# Patient Record
Sex: MALE | Race: WHITE | NOT HISPANIC OR LATINO | Employment: FULL TIME | URBAN - METROPOLITAN AREA
[De-identification: names, ages, dates, MRNs, and addresses within clinical notes are randomized per-mention and may not be internally consistent; named-entity substitution may affect disease eponyms.]

---

## 2023-05-08 ENCOUNTER — OFFICE VISIT (OUTPATIENT)
Dept: PRIMARY CARE | Facility: CLINIC | Age: 68
End: 2023-05-08
Payer: COMMERCIAL

## 2023-05-08 VITALS
BODY MASS INDEX: 39.98 KG/M2 | HEIGHT: 66 IN | DIASTOLIC BLOOD PRESSURE: 72 MMHG | HEART RATE: 60 BPM | SYSTOLIC BLOOD PRESSURE: 136 MMHG | TEMPERATURE: 98.1 F | OXYGEN SATURATION: 97 % | WEIGHT: 248.8 LBS

## 2023-05-08 DIAGNOSIS — E78.5 HYPERLIPIDEMIA, UNSPECIFIED HYPERLIPIDEMIA TYPE: ICD-10-CM

## 2023-05-08 DIAGNOSIS — N20.0 KIDNEY STONE: ICD-10-CM

## 2023-05-08 DIAGNOSIS — E66.01 OBESITY, MORBID (MULTI): ICD-10-CM

## 2023-05-08 DIAGNOSIS — I10 BENIGN ESSENTIAL HYPERTENSION: Primary | ICD-10-CM

## 2023-05-08 DIAGNOSIS — Z00.00 ANNUAL PHYSICAL EXAM: ICD-10-CM

## 2023-05-08 DIAGNOSIS — Z12.5 PROSTATE CANCER SCREENING: ICD-10-CM

## 2023-05-08 DIAGNOSIS — E11.9 CONTROLLED TYPE 2 DIABETES MELLITUS WITHOUT COMPLICATION, WITHOUT LONG-TERM CURRENT USE OF INSULIN (MULTI): ICD-10-CM

## 2023-05-08 PROBLEM — D49.2 SKIN NEOPLASM: Status: ACTIVE | Noted: 2023-05-08

## 2023-05-08 PROBLEM — M43.10 SPONDYLOLISTHESIS, ACQUIRED: Status: ACTIVE | Noted: 2023-05-08

## 2023-05-08 PROBLEM — M54.50 ACUTE LUMBAR BACK PAIN: Status: ACTIVE | Noted: 2023-05-08

## 2023-05-08 PROBLEM — E66.9 OBESITY: Status: ACTIVE | Noted: 2023-05-08

## 2023-05-08 PROBLEM — B35.3 TINEA PEDIS: Status: ACTIVE | Noted: 2023-05-08

## 2023-05-08 PROBLEM — R21 RASH: Status: ACTIVE | Noted: 2023-05-08

## 2023-05-08 PROBLEM — H61.22 IMPACTED CERUMEN OF LEFT EAR: Status: ACTIVE | Noted: 2023-05-08

## 2023-05-08 PROBLEM — R06.83 SNORING: Status: ACTIVE | Noted: 2023-05-08

## 2023-05-08 PROCEDURE — 4010F ACE/ARB THERAPY RXD/TAKEN: CPT | Performed by: INTERNAL MEDICINE

## 2023-05-08 PROCEDURE — 1036F TOBACCO NON-USER: CPT | Performed by: INTERNAL MEDICINE

## 2023-05-08 PROCEDURE — 3078F DIAST BP <80 MM HG: CPT | Performed by: INTERNAL MEDICINE

## 2023-05-08 PROCEDURE — 99214 OFFICE O/P EST MOD 30 MIN: CPT | Performed by: INTERNAL MEDICINE

## 2023-05-08 PROCEDURE — 3075F SYST BP GE 130 - 139MM HG: CPT | Performed by: INTERNAL MEDICINE

## 2023-05-08 PROCEDURE — 1159F MED LIST DOCD IN RCRD: CPT | Performed by: INTERNAL MEDICINE

## 2023-05-08 RX ORDER — METFORMIN HYDROCHLORIDE 1000 MG/1
1000 TABLET ORAL 2 TIMES DAILY
COMMUNITY
Start: 2012-01-27 | End: 2023-05-23 | Stop reason: SDUPTHER

## 2023-05-08 RX ORDER — SODIUM FLUORIDE 6 MG/ML
PASTE, DENTIFRICE DENTAL DAILY
COMMUNITY
Start: 2022-11-16

## 2023-05-08 RX ORDER — SPIRONOLACTONE 25 MG/1
25 TABLET ORAL DAILY
COMMUNITY
Start: 2011-08-31 | End: 2023-05-23 | Stop reason: SDUPTHER

## 2023-05-08 RX ORDER — QUINAPRIL 40 MG/1
40 TABLET ORAL DAILY
COMMUNITY
Start: 2011-08-31 | End: 2023-11-28 | Stop reason: ALTCHOICE

## 2023-05-08 RX ORDER — GLYBURIDE 5 MG/1
5 TABLET ORAL 2 TIMES DAILY
COMMUNITY
Start: 2012-10-05 | End: 2023-05-23 | Stop reason: SDUPTHER

## 2023-05-08 RX ORDER — METOPROLOL SUCCINATE 100 MG/1
100 TABLET, EXTENDED RELEASE ORAL DAILY
COMMUNITY
Start: 2015-02-15 | End: 2023-05-23 | Stop reason: SDUPTHER

## 2023-05-08 RX ORDER — ASPIRIN 81 MG/1
81 TABLET ORAL DAILY
COMMUNITY
Start: 2021-06-16

## 2023-05-08 RX ORDER — AMLODIPINE BESYLATE 5 MG/1
5 TABLET ORAL DAILY
COMMUNITY
Start: 2021-12-01 | End: 2023-05-23 | Stop reason: SDUPTHER

## 2023-05-08 RX ORDER — LISINOPRIL 40 MG/1
40 TABLET ORAL DAILY
COMMUNITY
End: 2023-08-23 | Stop reason: ALTCHOICE

## 2023-05-08 RX ORDER — ATORVASTATIN CALCIUM 10 MG/1
10 TABLET, FILM COATED ORAL DAILY
COMMUNITY
Start: 2011-08-31 | End: 2023-05-23 | Stop reason: SDUPTHER

## 2023-05-08 RX ORDER — PIOGLITAZONEHYDROCHLORIDE 45 MG/1
45 TABLET ORAL DAILY
COMMUNITY
Start: 2017-05-01 | End: 2023-05-23 | Stop reason: SDUPTHER

## 2023-05-08 RX ORDER — SITAGLIPTIN 50 MG/1
50 TABLET, FILM COATED ORAL DAILY
COMMUNITY
Start: 2019-06-07 | End: 2023-05-23 | Stop reason: SDUPTHER

## 2023-05-08 ASSESSMENT — ENCOUNTER SYMPTOMS
FATIGUE: 0
ENDOCRINE NEGATIVE: 1
UNEXPECTED WEIGHT CHANGE: 0
PSYCHIATRIC NEGATIVE: 1
HEADACHES: 0
GASTROINTESTINAL NEGATIVE: 1
DIFFICULTY URINATING: 0
DIZZINESS: 0
ABDOMINAL PAIN: 0
COUGH: 0
CHEST TIGHTNESS: 0
BLOOD IN STOOL: 0
RESPIRATORY NEGATIVE: 1
EYES NEGATIVE: 1
DIARRHEA: 0
DYSURIA: 0
NAUSEA: 0
SHORTNESS OF BREATH: 0
HEMATOLOGIC/LYMPHATIC NEGATIVE: 1
NEUROLOGICAL NEGATIVE: 1

## 2023-05-08 ASSESSMENT — PATIENT HEALTH QUESTIONNAIRE - PHQ9
SUM OF ALL RESPONSES TO PHQ9 QUESTIONS 1 AND 2: 0
1. LITTLE INTEREST OR PLEASURE IN DOING THINGS: NOT AT ALL
2. FEELING DOWN, DEPRESSED OR HOPELESS: NOT AT ALL

## 2023-05-08 NOTE — PROGRESS NOTES
"Subjective   Patient ID: Kevin Borja is a 67 y.o. male who presents for Follow up on medication .     Last seen a year ago,here for follow up on HTN,HLD,DM-2,taken his meds regularly,no side effect ,he denies any hypoglycemic reaction.  he denies any side effects .  he continue to work night shift,on feet all the time at work,he sleeps 4 hours straight,denies any fatigue.  in past he brought his home BP machine and found to be accurate.  He is planing on retiring soon.  He is due for labs.  He is back on Accupril(from being on Lisinopril temporarily because the other was out of stock)  he had his eye exam June 2021 and had no retinopathy.  He passed a kidney stone 3 months ago,but lost the stone,was shown to me on his phone,no recurrence of his back pain,he drinks enough water daily.                          Review of Systems   Constitutional:  Negative for fatigue and unexpected weight change.   HENT: Negative.  Negative for congestion.    Eyes: Negative.    Respiratory: Negative.  Negative for cough, chest tightness and shortness of breath.    Cardiovascular:  Negative for chest pain and leg swelling.   Gastrointestinal: Negative.  Negative for abdominal pain, blood in stool, diarrhea and nausea.   Endocrine: Negative.    Genitourinary: Negative.  Negative for difficulty urinating and dysuria.   Neurological: Negative.  Negative for dizziness and headaches.   Hematological: Negative.    Psychiatric/Behavioral: Negative.         Objective   /72 (BP Location: Right arm, Patient Position: Sitting, BP Cuff Size: Large adult)   Pulse 60   Temp 36.7 °C (98.1 °F) (Temporal)   Ht 1.67 m (5' 5.75\")   Wt 113 kg (248 lb 12.8 oz)   SpO2 97%   BMI 40.46 kg/m²     Physical Exam  Vitals reviewed.   Constitutional:       Appearance: Normal appearance.   HENT:      Head: Normocephalic and atraumatic.   Eyes:      Extraocular Movements: Extraocular movements intact.      Pupils: Pupils are equal, round, and reactive " to light.   Cardiovascular:      Rate and Rhythm: Normal rate and regular rhythm.      Heart sounds: Normal heart sounds.   Pulmonary:      Effort: Pulmonary effort is normal. No respiratory distress.      Breath sounds: Normal breath sounds. No wheezing or rhonchi.   Abdominal:      General: Abdomen is flat. Bowel sounds are normal.      Palpations: Abdomen is soft.      Tenderness: There is no abdominal tenderness.      Comments: Obese abdomen.   Musculoskeletal:         General: Normal range of motion.      Cervical back: Normal range of motion and neck supple.   Skin:     General: Skin is warm and dry.   Neurological:      General: No focal deficit present.      Mental Status: He is alert and oriented to person, place, and time.   Psychiatric:         Mood and Affect: Mood normal.         Behavior: Behavior normal.         Assessment/Plan   Problem List Items Addressed This Visit          Circulatory    Benign essential hypertension - Primary     Stable on current meds.            Endocrine/Metabolic    Diabetes mellitus type II, controlled (CMS/HCC)     Continue same meds.  Follow strict 1800 cy. ADA diet.  Order labs.  See ophthalmologist for yearly eye exam.         Obesity, morbid (CMS/HCC)     I spent >15 minutes face to face with individual providing recommendations for nutrition choices and exercise plan to help achieve weight reduction.            Other    Hyperlipidemia     Stable on current statin,follow low fat diet.          Other Visit Diagnoses       Kidney stone        keep well hydrated.    Prostate cancer screening        Relevant Orders    Prostate Specific Antigen    Annual physical exam        Relevant Orders    CBC and Auto Differential    Comprehensive Metabolic Panel    Hemoglobin A1C    Lipid Panel    Prostate Specific Antigen    TSH with reflex to Free T4 if abnormal    Albumin , Urine Random

## 2023-05-08 NOTE — PATIENT INSTRUCTIONS
I spent >15 minutes face to face with individual providing recommendations for nutrition choices and exercise plan to help achieve weight reduction.  Continue same meds.  Return in 3 months for physical.  See your ophthalmologist for yearly eye exam.

## 2023-05-08 NOTE — ASSESSMENT & PLAN NOTE
I spent >15 minutes face to face with individual providing recommendations for nutrition choices and exercise plan to help achieve weight reduction.

## 2023-05-23 DIAGNOSIS — E78.5 HYPERLIPIDEMIA, UNSPECIFIED HYPERLIPIDEMIA TYPE: ICD-10-CM

## 2023-05-23 DIAGNOSIS — I10 BENIGN ESSENTIAL HYPERTENSION: ICD-10-CM

## 2023-05-23 DIAGNOSIS — E11.9 CONTROLLED TYPE 2 DIABETES MELLITUS WITHOUT COMPLICATION, WITHOUT LONG-TERM CURRENT USE OF INSULIN (MULTI): ICD-10-CM

## 2023-05-23 RX ORDER — AMLODIPINE BESYLATE 5 MG/1
5 TABLET ORAL DAILY
Qty: 90 TABLET | Refills: 3 | Status: SHIPPED | OUTPATIENT
Start: 2023-05-23 | End: 2023-10-25 | Stop reason: SDUPTHER

## 2023-05-23 RX ORDER — GLYBURIDE 5 MG/1
5 TABLET ORAL 2 TIMES DAILY
Qty: 180 TABLET | Refills: 3 | Status: SHIPPED | OUTPATIENT
Start: 2023-05-23 | End: 2023-10-25 | Stop reason: SDUPTHER

## 2023-05-23 RX ORDER — SPIRONOLACTONE 25 MG/1
25 TABLET ORAL DAILY
Qty: 90 TABLET | Refills: 3 | Status: SHIPPED | OUTPATIENT
Start: 2023-05-23 | End: 2023-10-25 | Stop reason: SDUPTHER

## 2023-05-23 RX ORDER — METOPROLOL SUCCINATE 100 MG/1
100 TABLET, EXTENDED RELEASE ORAL DAILY
Qty: 90 TABLET | Refills: 3 | Status: SHIPPED | OUTPATIENT
Start: 2023-05-23 | End: 2023-10-25 | Stop reason: SDUPTHER

## 2023-05-23 RX ORDER — METFORMIN HYDROCHLORIDE 1000 MG/1
1000 TABLET ORAL 2 TIMES DAILY
Qty: 180 TABLET | Refills: 3 | Status: SHIPPED | OUTPATIENT
Start: 2023-05-23 | End: 2023-10-25 | Stop reason: SDUPTHER

## 2023-05-23 RX ORDER — ATORVASTATIN CALCIUM 10 MG/1
10 TABLET, FILM COATED ORAL DAILY
Qty: 90 TABLET | Refills: 3 | Status: SHIPPED | OUTPATIENT
Start: 2023-05-23 | End: 2023-10-25 | Stop reason: SDUPTHER

## 2023-05-23 RX ORDER — PIOGLITAZONEHYDROCHLORIDE 45 MG/1
45 TABLET ORAL DAILY
Qty: 90 TABLET | Refills: 3 | Status: SHIPPED | OUTPATIENT
Start: 2023-05-23 | End: 2023-10-25 | Stop reason: SDUPTHER

## 2023-08-23 ENCOUNTER — LAB (OUTPATIENT)
Dept: LAB | Facility: LAB | Age: 68
End: 2023-08-23
Payer: MEDICARE

## 2023-08-23 ENCOUNTER — OFFICE VISIT (OUTPATIENT)
Dept: PRIMARY CARE | Facility: CLINIC | Age: 68
End: 2023-08-23
Payer: COMMERCIAL

## 2023-08-23 VITALS
DIASTOLIC BLOOD PRESSURE: 80 MMHG | RESPIRATION RATE: 16 BRPM | WEIGHT: 250.2 LBS | BODY MASS INDEX: 40.21 KG/M2 | TEMPERATURE: 97.2 F | OXYGEN SATURATION: 97 % | SYSTOLIC BLOOD PRESSURE: 132 MMHG | HEIGHT: 66 IN | HEART RATE: 60 BPM

## 2023-08-23 DIAGNOSIS — E66.01 MORBID OBESITY WITH BODY MASS INDEX (BMI) OF 40.0 TO 44.9 IN ADULT (MULTI): ICD-10-CM

## 2023-08-23 DIAGNOSIS — Z12.11 COLON CANCER SCREENING: Primary | ICD-10-CM

## 2023-08-23 DIAGNOSIS — Z13.6 SCREENING FOR CARDIOVASCULAR CONDITION: ICD-10-CM

## 2023-08-23 DIAGNOSIS — Z00.00 ANNUAL PHYSICAL EXAM: ICD-10-CM

## 2023-08-23 DIAGNOSIS — E11.9 CONTROLLED TYPE 2 DIABETES MELLITUS WITHOUT COMPLICATION, WITHOUT LONG-TERM CURRENT USE OF INSULIN (MULTI): ICD-10-CM

## 2023-08-23 DIAGNOSIS — Z00.00 WELCOME TO MEDICARE PREVENTIVE VISIT: ICD-10-CM

## 2023-08-23 DIAGNOSIS — E78.5 HYPERLIPIDEMIA, UNSPECIFIED HYPERLIPIDEMIA TYPE: ICD-10-CM

## 2023-08-23 DIAGNOSIS — Z12.5 PROSTATE CANCER SCREENING: ICD-10-CM

## 2023-08-23 DIAGNOSIS — Z00.00 ROUTINE GENERAL MEDICAL EXAMINATION AT HEALTH CARE FACILITY: ICD-10-CM

## 2023-08-23 LAB
ALANINE AMINOTRANSFERASE (SGPT) (U/L) IN SER/PLAS: 9 U/L (ref 10–52)
ALBUMIN (G/DL) IN SER/PLAS: 4.2 G/DL (ref 3.4–5)
ALBUMIN (MG/L) IN URINE: 30.5 MG/L
ALBUMIN/CREATININE (UG/MG) IN URINE: 18.8 UG/MG CRT (ref 0–30)
ALKALINE PHOSPHATASE (U/L) IN SER/PLAS: 78 U/L (ref 33–136)
ANION GAP IN SER/PLAS: 12 MMOL/L (ref 10–20)
ASPARTATE AMINOTRANSFERASE (SGOT) (U/L) IN SER/PLAS: 16 U/L (ref 9–39)
BASOPHILS (10*3/UL) IN BLOOD BY AUTOMATED COUNT: 0.04 X10E9/L (ref 0–0.1)
BASOPHILS/100 LEUKOCYTES IN BLOOD BY AUTOMATED COUNT: 0.6 % (ref 0–2)
BILIRUBIN TOTAL (MG/DL) IN SER/PLAS: 1.9 MG/DL (ref 0–1.2)
CALCIUM (MG/DL) IN SER/PLAS: 9 MG/DL (ref 8.6–10.3)
CARBON DIOXIDE, TOTAL (MMOL/L) IN SER/PLAS: 26 MMOL/L (ref 21–32)
CHLORIDE (MMOL/L) IN SER/PLAS: 104 MMOL/L (ref 98–107)
CHOLESTEROL (MG/DL) IN SER/PLAS: 144 MG/DL (ref 0–199)
CHOLESTEROL IN HDL (MG/DL) IN SER/PLAS: 66.2 MG/DL
CHOLESTEROL/HDL RATIO: 2.2
CREATININE (MG/DL) IN SER/PLAS: 1.25 MG/DL (ref 0.5–1.3)
CREATININE (MG/DL) IN URINE: 162 MG/DL (ref 20–370)
EOSINOPHILS (10*3/UL) IN BLOOD BY AUTOMATED COUNT: 0.12 X10E9/L (ref 0–0.7)
EOSINOPHILS/100 LEUKOCYTES IN BLOOD BY AUTOMATED COUNT: 1.9 % (ref 0–6)
ERYTHROCYTE DISTRIBUTION WIDTH (RATIO) BY AUTOMATED COUNT: 13.7 % (ref 11.5–14.5)
ERYTHROCYTE MEAN CORPUSCULAR HEMOGLOBIN CONCENTRATION (G/DL) BY AUTOMATED: 32.4 G/DL (ref 32–36)
ERYTHROCYTE MEAN CORPUSCULAR VOLUME (FL) BY AUTOMATED COUNT: 90 FL (ref 80–100)
ERYTHROCYTES (10*6/UL) IN BLOOD BY AUTOMATED COUNT: 4.72 X10E12/L (ref 4.5–5.9)
ESTIMATED AVERAGE GLUCOSE FOR HBA1C: 117 MG/DL
GFR MALE: 63 ML/MIN/1.73M2
GLUCOSE (MG/DL) IN SER/PLAS: 99 MG/DL (ref 74–99)
HEMATOCRIT (%) IN BLOOD BY AUTOMATED COUNT: 42.6 % (ref 41–52)
HEMOGLOBIN (G/DL) IN BLOOD: 13.8 G/DL (ref 13.5–17.5)
HEMOGLOBIN A1C/HEMOGLOBIN TOTAL IN BLOOD: 5.7 %
IMMATURE GRANULOCYTES/100 LEUKOCYTES IN BLOOD BY AUTOMATED COUNT: 0.5 % (ref 0–0.9)
LDL: 62 MG/DL (ref 0–99)
LEUKOCYTES (10*3/UL) IN BLOOD BY AUTOMATED COUNT: 6.2 X10E9/L (ref 4.4–11.3)
LYMPHOCYTES (10*3/UL) IN BLOOD BY AUTOMATED COUNT: 0.89 X10E9/L (ref 1.2–4.8)
LYMPHOCYTES/100 LEUKOCYTES IN BLOOD BY AUTOMATED COUNT: 14.3 % (ref 13–44)
MONOCYTES (10*3/UL) IN BLOOD BY AUTOMATED COUNT: 0.6 X10E9/L (ref 0.1–1)
MONOCYTES/100 LEUKOCYTES IN BLOOD BY AUTOMATED COUNT: 9.6 % (ref 2–10)
NEUTROPHILS (10*3/UL) IN BLOOD BY AUTOMATED COUNT: 4.56 X10E9/L (ref 1.2–7.7)
NEUTROPHILS/100 LEUKOCYTES IN BLOOD BY AUTOMATED COUNT: 73.1 % (ref 40–80)
PLATELETS (10*3/UL) IN BLOOD AUTOMATED COUNT: 203 X10E9/L (ref 150–450)
POTASSIUM (MMOL/L) IN SER/PLAS: 4.2 MMOL/L (ref 3.5–5.3)
PROSTATE SPECIFIC AG (NG/ML) IN SER/PLAS: 1.36 NG/ML (ref 0–4)
PROTEIN TOTAL: 6.8 G/DL (ref 6.4–8.2)
SODIUM (MMOL/L) IN SER/PLAS: 138 MMOL/L (ref 136–145)
THYROTROPIN (MIU/L) IN SER/PLAS BY DETECTION LIMIT <= 0.05 MIU/L: 1.21 MIU/L (ref 0.44–3.98)
TRIGLYCERIDE (MG/DL) IN SER/PLAS: 81 MG/DL (ref 0–149)
UREA NITROGEN (MG/DL) IN SER/PLAS: 23 MG/DL (ref 6–23)
VLDL: 16 MG/DL (ref 0–40)

## 2023-08-23 PROCEDURE — 82043 UR ALBUMIN QUANTITATIVE: CPT

## 2023-08-23 PROCEDURE — 85025 COMPLETE CBC W/AUTO DIFF WBC: CPT

## 2023-08-23 PROCEDURE — 84443 ASSAY THYROID STIM HORMONE: CPT

## 2023-08-23 PROCEDURE — 90677 PCV20 VACCINE IM: CPT | Performed by: INTERNAL MEDICINE

## 2023-08-23 PROCEDURE — G0447 BEHAVIOR COUNSEL OBESITY 15M: HCPCS | Performed by: INTERNAL MEDICINE

## 2023-08-23 PROCEDURE — 1170F FXNL STATUS ASSESSED: CPT | Performed by: INTERNAL MEDICINE

## 2023-08-23 PROCEDURE — 1160F RVW MEDS BY RX/DR IN RCRD: CPT | Performed by: INTERNAL MEDICINE

## 2023-08-23 PROCEDURE — 99213 OFFICE O/P EST LOW 20 MIN: CPT | Performed by: INTERNAL MEDICINE

## 2023-08-23 PROCEDURE — G0103 PSA SCREENING: HCPCS

## 2023-08-23 PROCEDURE — 82570 ASSAY OF URINE CREATININE: CPT

## 2023-08-23 PROCEDURE — G0009 ADMIN PNEUMOCOCCAL VACCINE: HCPCS | Performed by: INTERNAL MEDICINE

## 2023-08-23 PROCEDURE — 93000 ELECTROCARDIOGRAM COMPLETE: CPT | Performed by: INTERNAL MEDICINE

## 2023-08-23 PROCEDURE — G0444 DEPRESSION SCREEN ANNUAL: HCPCS | Performed by: INTERNAL MEDICINE

## 2023-08-23 PROCEDURE — 3075F SYST BP GE 130 - 139MM HG: CPT | Performed by: INTERNAL MEDICINE

## 2023-08-23 PROCEDURE — 1159F MED LIST DOCD IN RCRD: CPT | Performed by: INTERNAL MEDICINE

## 2023-08-23 PROCEDURE — 80053 COMPREHEN METABOLIC PANEL: CPT

## 2023-08-23 PROCEDURE — G0442 ANNUAL ALCOHOL SCREEN 15 MIN: HCPCS | Performed by: INTERNAL MEDICINE

## 2023-08-23 PROCEDURE — G0402 INITIAL PREVENTIVE EXAM: HCPCS | Performed by: INTERNAL MEDICINE

## 2023-08-23 PROCEDURE — 3079F DIAST BP 80-89 MM HG: CPT | Performed by: INTERNAL MEDICINE

## 2023-08-23 PROCEDURE — 4010F ACE/ARB THERAPY RXD/TAKEN: CPT | Performed by: INTERNAL MEDICINE

## 2023-08-23 PROCEDURE — 80061 LIPID PANEL: CPT

## 2023-08-23 PROCEDURE — 3008F BODY MASS INDEX DOCD: CPT | Performed by: INTERNAL MEDICINE

## 2023-08-23 PROCEDURE — 1036F TOBACCO NON-USER: CPT | Performed by: INTERNAL MEDICINE

## 2023-08-23 PROCEDURE — 83036 HEMOGLOBIN GLYCOSYLATED A1C: CPT

## 2023-08-23 PROCEDURE — 36415 COLL VENOUS BLD VENIPUNCTURE: CPT

## 2023-08-23 ASSESSMENT — ACTIVITIES OF DAILY LIVING (ADL)
DRESSING: INDEPENDENT
DOING_HOUSEWORK: INDEPENDENT
MANAGING_FINANCES: INDEPENDENT
GROCERY_SHOPPING: INDEPENDENT
TAKING_MEDICATION: INDEPENDENT
BATHING: INDEPENDENT

## 2023-08-23 ASSESSMENT — ENCOUNTER SYMPTOMS
EYES NEGATIVE: 1
BLOOD IN STOOL: 0
ABDOMINAL PAIN: 0
DYSURIA: 0
UNEXPECTED WEIGHT CHANGE: 0
DIARRHEA: 0
RESPIRATORY NEGATIVE: 1
FATIGUE: 0
NEUROLOGICAL NEGATIVE: 1
ENDOCRINE NEGATIVE: 1
COUGH: 0
NAUSEA: 0
HEADACHES: 0
CHEST TIGHTNESS: 0
GASTROINTESTINAL NEGATIVE: 1
DIZZINESS: 0
PSYCHIATRIC NEGATIVE: 1
HEMATOLOGIC/LYMPHATIC NEGATIVE: 1
DIFFICULTY URINATING: 0
SHORTNESS OF BREATH: 0

## 2023-08-23 ASSESSMENT — PATIENT HEALTH QUESTIONNAIRE - PHQ9
SUM OF ALL RESPONSES TO PHQ9 QUESTIONS 1 AND 2: 0
2. FEELING DOWN, DEPRESSED OR HOPELESS: NOT AT ALL
1. LITTLE INTEREST OR PLEASURE IN DOING THINGS: NOT AT ALL

## 2023-08-23 NOTE — ASSESSMENT & PLAN NOTE
Follow 1800 cy ADA diet.  Continue same meds.  Order labs.  Yearly eye exam.  Plan to give hep B vaccine at next visit.

## 2023-08-23 NOTE — PROGRESS NOTES
"Subjective   Patient ID: Kevin Borja is a 68 y.o. male who presents for Welcome To Medicare and Follow-up (3 month).    Here for welcome to medicare  and  for follow up on HTN,HLD,DM-2,taken his meds regularly,no side effect ,he denies any hypoglycemic reaction.  he denies any side effects .  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  He is planing on eating healthier and losing weight,now that he has time.  He sees his ophthalmologist yearly   in past he brought his home BP machine and found to be accurate.      He is back on Accupril(from being on Lisinopril temporarily because the other was out of stock)  he had his eye exam June 2021 and had no retinopathy.  He passed a kidney stone 3 months ago,but lost the stone,was shown to me on his phone,no recurrence of his back pain,he drinks enough water daily.                           Review of Systems   Constitutional:  Negative for fatigue and unexpected weight change.   HENT: Negative.  Negative for congestion.    Eyes: Negative.    Respiratory: Negative.  Negative for cough, chest tightness and shortness of breath.    Cardiovascular:  Negative for chest pain and leg swelling.   Gastrointestinal: Negative.  Negative for abdominal pain, blood in stool, diarrhea and nausea.   Endocrine: Negative.    Genitourinary: Negative.  Negative for difficulty urinating and dysuria.   Neurological: Negative.  Negative for dizziness and headaches.   Hematological: Negative.    Psychiatric/Behavioral: Negative.         Objective   /80   Pulse 60   Temp 36.2 °C (97.2 °F)   Resp 16   Ht 1.676 m (5' 6\")   Wt 113 kg (250 lb 3.2 oz)   SpO2 97%   BMI 40.38 kg/m²     Physical Exam  Vitals reviewed.   Constitutional:       Appearance: Normal appearance. He is obese.   HENT:      Head: Normocephalic and atraumatic.      Right Ear: Tympanic membrane, ear canal and external ear normal. There is no impacted cerumen.      Left Ear: Tympanic membrane, ear canal and " external ear normal. There is no impacted cerumen.      Mouth/Throat:      Mouth: Mucous membranes are moist.      Pharynx: No oropharyngeal exudate or posterior oropharyngeal erythema.   Eyes:      Extraocular Movements: Extraocular movements intact.      Conjunctiva/sclera: Conjunctivae normal.      Pupils: Pupils are equal, round, and reactive to light.   Neck:      Vascular: No carotid bruit.   Cardiovascular:      Rate and Rhythm: Normal rate and regular rhythm.      Pulses:           Dorsalis pedis pulses are 3+ on the right side and 3+ on the left side.        Posterior tibial pulses are 3+ on the right side and 3+ on the left side.      Heart sounds: Normal heart sounds. No murmur heard.  Pulmonary:      Effort: Pulmonary effort is normal. No respiratory distress.      Breath sounds: Normal breath sounds. No wheezing or rhonchi.   Abdominal:      General: Abdomen is flat. Bowel sounds are normal.      Palpations: Abdomen is soft.      Tenderness: There is no abdominal tenderness.      Comments: Obese abdomen.   Musculoskeletal:         General: Normal range of motion.      Cervical back: Normal range of motion and neck supple.      Right lower leg: No edema.      Left lower leg: No edema.      Right foot: Normal range of motion. No deformity or foot drop.      Left foot: Normal range of motion. No deformity or foot drop.   Feet:      Right foot:      Skin integrity: Skin integrity normal.      Left foot:      Skin integrity: Skin integrity normal.   Lymphadenopathy:      Cervical: No cervical adenopathy.   Skin:     General: Skin is warm and dry.   Neurological:      General: No focal deficit present.      Mental Status: He is alert and oriented to person, place, and time.      Cranial Nerves: No cranial nerve deficit.      Coordination: Coordination normal.   Psychiatric:         Mood and Affect: Mood normal.         Behavior: Behavior normal.         Assessment/Plan   Problem List Items Addressed This  Visit       Diabetes mellitus type II, controlled (CMS/[facility])     Follow 1800 cy ADA diet.  Continue same meds.  Order labs.  Yearly eye exam.  Plan to give hep B vaccine at next visit.         Relevant Orders    Albumin , Urine Random    Hyperlipidemia     On statin.  Check labs.         Morbid obesity with body mass index (BMI) of 40.0 to 44.9 in adult (CMS/[facility])     I spent >15 minutes face to face with individual providing recommendations for nutrition choices and exercise plan to help achieve weight reduction.         Welcome to Medicare preventive visit     EKG,vision test and Prevnar 20 done today.  Plan on giving hep B vaccine series at next visit.  Order colonoscopy.  Go for labs.          Other Visit Diagnoses       Colon cancer screening    -  Primary    Relevant Orders    Colonoscopy Screening    Screening for cardiovascular condition        Relevant Orders    ECG 12 lead (Completed)    Routine general medical examination at health care facility

## 2023-08-23 NOTE — ASSESSMENT & PLAN NOTE
EKG,vision test and Prevnar 20 done today.  Plan on giving hep B vaccine series at next visit.  Order colonoscopy.  Go for labs.

## 2023-10-25 DIAGNOSIS — E78.5 HYPERLIPIDEMIA, UNSPECIFIED HYPERLIPIDEMIA TYPE: ICD-10-CM

## 2023-10-25 DIAGNOSIS — E11.9 CONTROLLED TYPE 2 DIABETES MELLITUS WITHOUT COMPLICATION, WITHOUT LONG-TERM CURRENT USE OF INSULIN (MULTI): ICD-10-CM

## 2023-10-25 DIAGNOSIS — I10 BENIGN ESSENTIAL HYPERTENSION: Primary | ICD-10-CM

## 2023-10-25 DIAGNOSIS — I10 BENIGN ESSENTIAL HYPERTENSION: ICD-10-CM

## 2023-10-25 RX ORDER — GLYBURIDE 5 MG/1
5 TABLET ORAL 2 TIMES DAILY
Qty: 180 TABLET | Refills: 3 | Status: SHIPPED | OUTPATIENT
Start: 2023-10-25

## 2023-10-25 RX ORDER — METFORMIN HYDROCHLORIDE 1000 MG/1
1000 TABLET ORAL 2 TIMES DAILY
Qty: 180 TABLET | Refills: 3 | Status: SHIPPED | OUTPATIENT
Start: 2023-10-25

## 2023-10-25 RX ORDER — AMLODIPINE BESYLATE 5 MG/1
5 TABLET ORAL DAILY
Qty: 90 TABLET | Refills: 3 | Status: SHIPPED | OUTPATIENT
Start: 2023-10-25

## 2023-10-25 RX ORDER — ATORVASTATIN CALCIUM 10 MG/1
10 TABLET, FILM COATED ORAL DAILY
Qty: 90 TABLET | Refills: 3 | Status: SHIPPED | OUTPATIENT
Start: 2023-10-25

## 2023-10-25 RX ORDER — PIOGLITAZONEHYDROCHLORIDE 45 MG/1
45 TABLET ORAL DAILY
Qty: 90 TABLET | Refills: 3 | Status: SHIPPED | OUTPATIENT
Start: 2023-10-25

## 2023-10-25 RX ORDER — LISINOPRIL 40 MG/1
40 TABLET ORAL DAILY
COMMUNITY
End: 2023-10-25 | Stop reason: SDUPTHER

## 2023-10-25 RX ORDER — LISINOPRIL 40 MG/1
40 TABLET ORAL DAILY
Qty: 90 TABLET | Refills: 3 | Status: SHIPPED | OUTPATIENT
Start: 2023-10-25

## 2023-10-25 RX ORDER — SPIRONOLACTONE 25 MG/1
25 TABLET ORAL DAILY
Qty: 90 TABLET | Refills: 3 | Status: SHIPPED | OUTPATIENT
Start: 2023-10-25

## 2023-10-25 RX ORDER — METOPROLOL SUCCINATE 100 MG/1
100 TABLET, EXTENDED RELEASE ORAL DAILY
Qty: 90 TABLET | Refills: 3 | Status: SHIPPED | OUTPATIENT
Start: 2023-10-25

## 2023-11-28 ENCOUNTER — OFFICE VISIT (OUTPATIENT)
Dept: PRIMARY CARE | Facility: CLINIC | Age: 68
End: 2023-11-28
Payer: MEDICARE

## 2023-11-28 VITALS
HEIGHT: 66 IN | TEMPERATURE: 98.4 F | HEART RATE: 63 BPM | SYSTOLIC BLOOD PRESSURE: 130 MMHG | WEIGHT: 259 LBS | BODY MASS INDEX: 41.62 KG/M2 | DIASTOLIC BLOOD PRESSURE: 68 MMHG | OXYGEN SATURATION: 95 %

## 2023-11-28 DIAGNOSIS — I10 BENIGN ESSENTIAL HYPERTENSION: ICD-10-CM

## 2023-11-28 DIAGNOSIS — E78.5 HYPERLIPIDEMIA, UNSPECIFIED HYPERLIPIDEMIA TYPE: ICD-10-CM

## 2023-11-28 DIAGNOSIS — E66.01 CLASS 3 SEVERE OBESITY DUE TO EXCESS CALORIES WITH SERIOUS COMORBIDITY AND BODY MASS INDEX (BMI) OF 40.0 TO 44.9 IN ADULT (MULTI): ICD-10-CM

## 2023-11-28 DIAGNOSIS — Z13.6 SCREENING FOR HEART DISEASE: Primary | ICD-10-CM

## 2023-11-28 DIAGNOSIS — Z23 NEED FOR INFLUENZA VACCINATION: ICD-10-CM

## 2023-11-28 DIAGNOSIS — E11.9 CONTROLLED TYPE 2 DIABETES MELLITUS WITHOUT COMPLICATION, WITHOUT LONG-TERM CURRENT USE OF INSULIN (MULTI): ICD-10-CM

## 2023-11-28 PROCEDURE — 90662 IIV NO PRSV INCREASED AG IM: CPT | Performed by: INTERNAL MEDICINE

## 2023-11-28 PROCEDURE — 3008F BODY MASS INDEX DOCD: CPT | Performed by: INTERNAL MEDICINE

## 2023-11-28 PROCEDURE — 1159F MED LIST DOCD IN RCRD: CPT | Performed by: INTERNAL MEDICINE

## 2023-11-28 PROCEDURE — 4010F ACE/ARB THERAPY RXD/TAKEN: CPT | Performed by: INTERNAL MEDICINE

## 2023-11-28 PROCEDURE — 1036F TOBACCO NON-USER: CPT | Performed by: INTERNAL MEDICINE

## 2023-11-28 PROCEDURE — 3075F SYST BP GE 130 - 139MM HG: CPT | Performed by: INTERNAL MEDICINE

## 2023-11-28 PROCEDURE — 3044F HG A1C LEVEL LT 7.0%: CPT | Performed by: INTERNAL MEDICINE

## 2023-11-28 PROCEDURE — 99214 OFFICE O/P EST MOD 30 MIN: CPT | Performed by: INTERNAL MEDICINE

## 2023-11-28 PROCEDURE — 1160F RVW MEDS BY RX/DR IN RCRD: CPT | Performed by: INTERNAL MEDICINE

## 2023-11-28 PROCEDURE — 3078F DIAST BP <80 MM HG: CPT | Performed by: INTERNAL MEDICINE

## 2023-11-28 PROCEDURE — G0008 ADMIN INFLUENZA VIRUS VAC: HCPCS | Performed by: INTERNAL MEDICINE

## 2023-11-28 ASSESSMENT — ENCOUNTER SYMPTOMS
RESPIRATORY NEGATIVE: 1
PSYCHIATRIC NEGATIVE: 1
CHEST TIGHTNESS: 0
NAUSEA: 0
ENDOCRINE NEGATIVE: 1
DIZZINESS: 0
COUGH: 0
BLOOD IN STOOL: 0
SHORTNESS OF BREATH: 0
DIARRHEA: 0
EYES NEGATIVE: 1
NEUROLOGICAL NEGATIVE: 1
ABDOMINAL PAIN: 0
GASTROINTESTINAL NEGATIVE: 1
FATIGUE: 0
HEMATOLOGIC/LYMPHATIC NEGATIVE: 1
DYSURIA: 0
HEADACHES: 0
DIFFICULTY URINATING: 0

## 2023-11-28 ASSESSMENT — PATIENT HEALTH QUESTIONNAIRE - PHQ9
2. FEELING DOWN, DEPRESSED OR HOPELESS: NOT AT ALL
1. LITTLE INTEREST OR PLEASURE IN DOING THINGS: NOT AT ALL
SUM OF ALL RESPONSES TO PHQ9 QUESTIONS 1 AND 2: 0

## 2023-11-28 NOTE — PROGRESS NOTES
"Subjective   Patient ID: Kevin Borja is a 68 y.o. male who presents for 3 month follow up .    Here for follow up on HTN,HLD,DM-2,taken his meds regularly,no side effect ,he denies any hypoglycemic reaction.  Here to go over his lab result.  He put on few pound.  He brought all of his medication [employer] back for me to review them with him.  They were updated on med list.  he denies any side effects .  he recently retired and \"enjoying his long term\".  He is planing on eating healthier and losing weight,now that he has time.  He sees his ophthalmologist yearly   in past he brought his home BP machine and found to be accurate.    he had his eye exam June 2021 and had no retinopathy.  He passed a kidney stone few months ago,but lost the stone,was shown to me on his phone,no recurrence of his back pain,he drinks enough water daily.         Review of Systems   Constitutional:  Negative for fatigue.        He gained weight.   HENT: Negative.  Negative for congestion.    Eyes: Negative.    Respiratory: Negative.  Negative for cough, chest tightness and shortness of breath.    Cardiovascular:  Negative for chest pain and leg swelling.   Gastrointestinal: Negative.  Negative for abdominal pain, blood in stool, diarrhea and nausea.   Endocrine: Negative.    Genitourinary: Negative.  Negative for difficulty urinating and dysuria.   Neurological: Negative.  Negative for dizziness and headaches.   Hematological: Negative.    Psychiatric/Behavioral: Negative.         Objective   /68 (BP Location: Right arm, Patient Position: Sitting)   Pulse 63   Temp 36.9 °C (98.4 °F) (Temporal)   Ht 1.676 m (5' 6\")   Wt 117 kg (259 lb)   SpO2 95%   BMI 41.80 kg/m²     Physical Exam  Vitals reviewed.   Constitutional:       Appearance: Normal appearance.   HENT:      Head: Normocephalic and atraumatic.   Eyes:      Extraocular Movements: Extraocular movements intact.      Pupils: Pupils are equal, round, and reactive to light. "   Neck:      Vascular: No carotid bruit.   Cardiovascular:      Rate and Rhythm: Normal rate and regular rhythm.      Heart sounds: Normal heart sounds.   Pulmonary:      Effort: Pulmonary effort is normal. No respiratory distress.      Breath sounds: Normal breath sounds. No wheezing or rhonchi.   Abdominal:      General: Abdomen is flat. Bowel sounds are normal.      Palpations: Abdomen is soft.      Tenderness: There is no abdominal tenderness.   Musculoskeletal:         General: Normal range of motion.      Cervical back: Normal range of motion and neck supple.   Lymphadenopathy:      Cervical: No cervical adenopathy.   Skin:     General: Skin is warm and dry.   Neurological:      General: No focal deficit present.      Mental Status: He is alert and oriented to person, place, and time.   Psychiatric:         Mood and Affect: Mood normal.         Behavior: Behavior normal.         Assessment/Plan   Problem List Items Addressed This Visit             ICD-10-CM    Benign essential hypertension I10     Stable on current medication.         Diabetes mellitus type II, controlled (CMS/[facility]) E11.9     Reinforced 1800-calorie ADA diet.  Patient to get the Accu-Chek machine through his insurance.  Reinforced regular exercise.  Follow-up in 3 months at that time we will do A1c and Accu-Chek.  Continue same medication.  A1c at goal.         Hyperlipidemia E78.5     Follow low-fat diet.  Continue statin.         Class 3 severe obesity due to excess calories with serious comorbidity and body mass index (BMI) of 40.0 to 44.9 in adult (CMS/[facility]) E66.01, Z68.41     I spent >15 minutes face to face with individual providing recommendations for nutrition choices and exercise plan to help achieve weight reduction.          Other Visit Diagnoses         Codes    Screening for heart disease    -  Primary Z13.6    Relevant Orders    CT cardiac scoring wo IV contrast    Need for influenza vaccination     Z23    Relevant Orders    Flu  vaccine, quadrivalent, high-dose, preservative free, age 65y+ (FLUZONE) (Completed)

## 2023-11-29 NOTE — ASSESSMENT & PLAN NOTE
Reinforced 1800-calorie ADA diet.  Patient to get the Accu-Chek machine through his insurance.  Reinforced regular exercise.  Follow-up in 3 months at that time we will do A1c and Accu-Chek.  Continue same medication.  A1c at goal.

## 2024-01-29 ENCOUNTER — APPOINTMENT (OUTPATIENT)
Dept: PRIMARY CARE | Facility: CLINIC | Age: 69
End: 2024-01-29
Payer: MEDICARE

## 2024-03-15 ENCOUNTER — APPOINTMENT (OUTPATIENT)
Dept: PRIMARY CARE | Facility: CLINIC | Age: 69
End: 2024-03-15
Payer: MEDICARE

## 2024-03-15 ENCOUNTER — OFFICE VISIT (OUTPATIENT)
Dept: PRIMARY CARE | Facility: CLINIC | Age: 69
End: 2024-03-15
Payer: MEDICARE

## 2024-03-15 VITALS
WEIGHT: 261.2 LBS | HEIGHT: 66 IN | DIASTOLIC BLOOD PRESSURE: 90 MMHG | SYSTOLIC BLOOD PRESSURE: 147 MMHG | OXYGEN SATURATION: 97 % | BODY MASS INDEX: 41.98 KG/M2 | TEMPERATURE: 98.6 F | HEART RATE: 66 BPM

## 2024-03-15 DIAGNOSIS — Z00.00 INITIAL MEDICARE ANNUAL WELLNESS VISIT: Primary | ICD-10-CM

## 2024-03-15 DIAGNOSIS — Z00.00 ROUTINE GENERAL MEDICAL EXAMINATION AT HEALTH CARE FACILITY: ICD-10-CM

## 2024-03-15 DIAGNOSIS — I10 BENIGN ESSENTIAL HYPERTENSION: ICD-10-CM

## 2024-03-15 DIAGNOSIS — E66.01 CLASS 3 SEVERE OBESITY DUE TO EXCESS CALORIES WITH SERIOUS COMORBIDITY AND BODY MASS INDEX (BMI) OF 40.0 TO 44.9 IN ADULT (MULTI): ICD-10-CM

## 2024-03-15 DIAGNOSIS — E11.9 CONTROLLED TYPE 2 DIABETES MELLITUS WITHOUT COMPLICATION, WITHOUT LONG-TERM CURRENT USE OF INSULIN (MULTI): ICD-10-CM

## 2024-03-15 LAB
POC FINGERSTICK BLOOD GLUCOSE: 78 MG/DL (ref 70–100)
POC HEMOGLOBIN A1C: 6 % (ref 4.2–6.5)

## 2024-03-15 PROCEDURE — 1036F TOBACCO NON-USER: CPT | Performed by: INTERNAL MEDICINE

## 2024-03-15 PROCEDURE — 1159F MED LIST DOCD IN RCRD: CPT | Performed by: INTERNAL MEDICINE

## 2024-03-15 PROCEDURE — 1158F ADVNC CARE PLAN TLK DOCD: CPT | Performed by: INTERNAL MEDICINE

## 2024-03-15 PROCEDURE — 1160F RVW MEDS BY RX/DR IN RCRD: CPT | Performed by: INTERNAL MEDICINE

## 2024-03-15 PROCEDURE — G0447 BEHAVIOR COUNSEL OBESITY 15M: HCPCS | Performed by: INTERNAL MEDICINE

## 2024-03-15 PROCEDURE — 3080F DIAST BP >= 90 MM HG: CPT | Performed by: INTERNAL MEDICINE

## 2024-03-15 PROCEDURE — 1123F ACP DISCUSS/DSCN MKR DOCD: CPT | Performed by: INTERNAL MEDICINE

## 2024-03-15 PROCEDURE — 3008F BODY MASS INDEX DOCD: CPT | Performed by: INTERNAL MEDICINE

## 2024-03-15 PROCEDURE — 82962 GLUCOSE BLOOD TEST: CPT | Performed by: INTERNAL MEDICINE

## 2024-03-15 PROCEDURE — 1170F FXNL STATUS ASSESSED: CPT | Performed by: INTERNAL MEDICINE

## 2024-03-15 PROCEDURE — G0439 PPPS, SUBSEQ VISIT: HCPCS | Performed by: INTERNAL MEDICINE

## 2024-03-15 PROCEDURE — 99214 OFFICE O/P EST MOD 30 MIN: CPT | Performed by: INTERNAL MEDICINE

## 2024-03-15 PROCEDURE — 3077F SYST BP >= 140 MM HG: CPT | Performed by: INTERNAL MEDICINE

## 2024-03-15 PROCEDURE — 83036 HEMOGLOBIN GLYCOSYLATED A1C: CPT | Performed by: INTERNAL MEDICINE

## 2024-03-15 PROCEDURE — 4010F ACE/ARB THERAPY RXD/TAKEN: CPT | Performed by: INTERNAL MEDICINE

## 2024-03-15 RX ORDER — BLOOD-GLUCOSE CONTROL, NORMAL
90 EACH MISCELLANEOUS DAILY
COMMUNITY
End: 2024-03-15 | Stop reason: SDUPTHER

## 2024-03-15 ASSESSMENT — ACTIVITIES OF DAILY LIVING (ADL)
BATHING: INDEPENDENT
TAKING_MEDICATION: INDEPENDENT
GROCERY_SHOPPING: INDEPENDENT
DRESSING: INDEPENDENT
DOING_HOUSEWORK: INDEPENDENT
MANAGING_FINANCES: INDEPENDENT

## 2024-03-15 NOTE — PROGRESS NOTES
"Subjective   Patient ID: Kevin Borja is a 68 y.o. male who presents for Medicare Annual Wellness Visit Subsequent and 2 month follow up .    Here for MCR  and to follow up on HTN,HLD,DM-2,taken his meds regularly,no side effect ,he denies any hypoglycemic reaction.  Here to go over his medications again,he brought the bottle Rx and his new BP monitor to get it validated.  He put on few pound.  he denies any side effects .  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  He is planing on eating healthier and losing weight,now that he has time.  He sees his ophthalmologist yearly   he had his eye exam within  1 year and had no retinopathy.  He passed a kidney stone few months ago,but lost the stone,was shown to me on his phone,no recurrence of his back pain,he drinks enough water daily.         Review of Systems   Constitutional:  Negative for fatigue and unexpected weight change.   HENT: Negative.  Negative for congestion.    Eyes: Negative.    Respiratory: Negative.  Negative for cough, chest tightness and shortness of breath.    Cardiovascular:  Negative for chest pain and leg swelling.   Gastrointestinal: Negative.  Negative for abdominal pain, blood in stool, diarrhea and nausea.   Endocrine: Negative.    Genitourinary: Negative.  Negative for difficulty urinating and dysuria.   Neurological: Negative.  Negative for dizziness and headaches.   Hematological: Negative.    Psychiatric/Behavioral: Negative.         Objective   /90 (BP Location: Left arm, Patient Position: Sitting, BP Cuff Size: Large adult)   Pulse 66   Temp 37 °C (98.6 °F) (Temporal)   Ht 1.67 m (5' 5.75\")   Wt 118 kg (261 lb 3.2 oz)   SpO2 97%   BMI 42.48 kg/m²     Physical Exam  Vitals reviewed.   Constitutional:       Appearance: Normal appearance.   HENT:      Head: Normocephalic and atraumatic.   Eyes:      Extraocular Movements: Extraocular movements intact.      Pupils: Pupils are equal, round, and reactive to light.   Neck: "      Vascular: No carotid bruit.   Cardiovascular:      Rate and Rhythm: Normal rate and regular rhythm.      Heart sounds: Normal heart sounds.   Pulmonary:      Effort: Pulmonary effort is normal. No respiratory distress.      Breath sounds: Normal breath sounds. No wheezing or rhonchi.   Abdominal:      General: Abdomen is flat. Bowel sounds are normal.      Palpations: Abdomen is soft.      Tenderness: There is no abdominal tenderness.   Musculoskeletal:         General: Normal range of motion.      Cervical back: Normal range of motion and neck supple.   Lymphadenopathy:      Cervical: No cervical adenopathy.   Skin:     General: Skin is warm and dry.   Neurological:      General: No focal deficit present.      Mental Status: He is alert and oriented to person, place, and time.   Psychiatric:         Mood and Affect: Mood normal.         Behavior: Behavior normal.         Assessment/Plan   Problem List Items Addressed This Visit             ICD-10-CM    Benign essential hypertension I10     Stable on current medication.  He has whitecoat syndrome.         Diabetes mellitus type II, controlled (CMS/[facility]) E11.9     Stable on current meds.  Follow 1800 cy ADA diet.  Monitor labs.         Relevant Medications    lancets 30 gauge misc    Other Relevant Orders    POCT Fingerstick Glucose manually resulted (Completed)    POCT glycosylated hemoglobin (Hb A1C) manually resulted (Completed)    Class 3 severe obesity due to excess calories with serious comorbidity and body mass index (BMI) of 40.0 to 44.9 in adult (CMS/[facility]) E66.01, Z68.41     I spent >15 minutes face to face with individual providing recommendations for nutrition choices and exercise plan to help achieve weight reduction.         Initial Medicare annual wellness visit - Primary Z00.00     Patient to bring copy of his living will and POA.          Other Visit Diagnoses         Codes    Routine general medical examination at health care facility     Z00.00

## 2024-03-17 RX ORDER — BLOOD-GLUCOSE CONTROL, NORMAL
90 EACH MISCELLANEOUS DAILY
Qty: 90 EACH | Refills: 3 | Status: SHIPPED | OUTPATIENT
Start: 2024-03-17

## 2024-03-17 ASSESSMENT — ENCOUNTER SYMPTOMS
DYSURIA: 0
SHORTNESS OF BREATH: 0
DIZZINESS: 0
NEUROLOGICAL NEGATIVE: 1
HEADACHES: 0
DIFFICULTY URINATING: 0
EYES NEGATIVE: 1
COUGH: 0
HEMATOLOGIC/LYMPHATIC NEGATIVE: 1
FATIGUE: 0
DIARRHEA: 0
NAUSEA: 0
UNEXPECTED WEIGHT CHANGE: 0
GASTROINTESTINAL NEGATIVE: 1
RESPIRATORY NEGATIVE: 1
CHEST TIGHTNESS: 0
BLOOD IN STOOL: 0
ABDOMINAL PAIN: 0
ENDOCRINE NEGATIVE: 1
PSYCHIATRIC NEGATIVE: 1

## 2024-07-29 ENCOUNTER — APPOINTMENT (OUTPATIENT)
Dept: PRIMARY CARE | Facility: CLINIC | Age: 69
End: 2024-07-29
Payer: MEDICARE

## 2024-07-29 ENCOUNTER — LAB (OUTPATIENT)
Dept: LAB | Facility: LAB | Age: 69
End: 2024-07-29
Payer: MEDICARE

## 2024-07-29 VITALS
WEIGHT: 254.2 LBS | SYSTOLIC BLOOD PRESSURE: 138 MMHG | DIASTOLIC BLOOD PRESSURE: 72 MMHG | BODY MASS INDEX: 41.34 KG/M2 | TEMPERATURE: 98 F | HEART RATE: 59 BPM | OXYGEN SATURATION: 95 %

## 2024-07-29 DIAGNOSIS — N18.31 STAGE 3A CHRONIC KIDNEY DISEASE (MULTI): ICD-10-CM

## 2024-07-29 DIAGNOSIS — E11.9 CONTROLLED TYPE 2 DIABETES MELLITUS WITHOUT COMPLICATION, WITHOUT LONG-TERM CURRENT USE OF INSULIN (MULTI): Primary | ICD-10-CM

## 2024-07-29 DIAGNOSIS — Z11.59 NEED FOR HEPATITIS C SCREENING TEST: ICD-10-CM

## 2024-07-29 DIAGNOSIS — E78.5 HYPERLIPIDEMIA, UNSPECIFIED HYPERLIPIDEMIA TYPE: ICD-10-CM

## 2024-07-29 DIAGNOSIS — Z12.5 SCREENING FOR PROSTATE CANCER: ICD-10-CM

## 2024-07-29 DIAGNOSIS — I10 BENIGN ESSENTIAL HYPERTENSION: ICD-10-CM

## 2024-07-29 DIAGNOSIS — E11.9 CONTROLLED TYPE 2 DIABETES MELLITUS WITHOUT COMPLICATION, WITHOUT LONG-TERM CURRENT USE OF INSULIN (MULTI): ICD-10-CM

## 2024-07-29 DIAGNOSIS — E66.01 CLASS 3 SEVERE OBESITY DUE TO EXCESS CALORIES WITH SERIOUS COMORBIDITY AND BODY MASS INDEX (BMI) OF 40.0 TO 44.9 IN ADULT (MULTI): ICD-10-CM

## 2024-07-29 LAB
ALBUMIN SERPL BCP-MCNC: 4.3 G/DL (ref 3.4–5)
ALP SERPL-CCNC: 91 U/L (ref 33–136)
ALT SERPL W P-5'-P-CCNC: 18 U/L (ref 10–52)
ANION GAP SERPL CALC-SCNC: 16 MMOL/L (ref 10–20)
AST SERPL W P-5'-P-CCNC: 31 U/L (ref 9–39)
BILIRUB SERPL-MCNC: 1.6 MG/DL (ref 0–1.2)
BUN SERPL-MCNC: 23 MG/DL (ref 6–23)
CALCIUM SERPL-MCNC: 9.6 MG/DL (ref 8.6–10.6)
CHLORIDE SERPL-SCNC: 102 MMOL/L (ref 98–107)
CHOLEST SERPL-MCNC: 147 MG/DL (ref 0–199)
CHOLESTEROL/HDL RATIO: 2.2
CO2 SERPL-SCNC: 27 MMOL/L (ref 21–32)
CREAT SERPL-MCNC: 1.31 MG/DL (ref 0.5–1.3)
EGFRCR SERPLBLD CKD-EPI 2021: 59 ML/MIN/1.73M*2
ERYTHROCYTE [DISTWIDTH] IN BLOOD BY AUTOMATED COUNT: 13.1 % (ref 11.5–14.5)
EST. AVERAGE GLUCOSE BLD GHB EST-MCNC: 157 MG/DL
GLUCOSE SERPL-MCNC: 99 MG/DL (ref 74–99)
HBA1C MFR BLD: 7.1 %
HCT VFR BLD AUTO: 49.6 % (ref 41–52)
HCV AB SER QL: NONREACTIVE
HDLC SERPL-MCNC: 67.6 MG/DL
HGB BLD-MCNC: 15.5 G/DL (ref 13.5–17.5)
LDLC SERPL CALC-MCNC: 56 MG/DL
MCH RBC QN AUTO: 29.5 PG (ref 26–34)
MCHC RBC AUTO-ENTMCNC: 31.3 G/DL (ref 32–36)
MCV RBC AUTO: 95 FL (ref 80–100)
NON HDL CHOLESTEROL: 79 MG/DL (ref 0–149)
NRBC BLD-RTO: 0 /100 WBCS (ref 0–0)
PLATELET # BLD AUTO: 234 X10*3/UL (ref 150–450)
POTASSIUM SERPL-SCNC: 4.7 MMOL/L (ref 3.5–5.3)
PROT SERPL-MCNC: 7.2 G/DL (ref 6.4–8.2)
RBC # BLD AUTO: 5.25 X10*6/UL (ref 4.5–5.9)
SODIUM SERPL-SCNC: 140 MMOL/L (ref 136–145)
TRIGL SERPL-MCNC: 118 MG/DL (ref 0–149)
TSH SERPL-ACNC: 1.19 MIU/L (ref 0.44–3.98)
VLDL: 24 MG/DL (ref 0–40)
WBC # BLD AUTO: 7.3 X10*3/UL (ref 4.4–11.3)

## 2024-07-29 PROCEDURE — 99214 OFFICE O/P EST MOD 30 MIN: CPT | Performed by: INTERNAL MEDICINE

## 2024-07-29 PROCEDURE — 1160F RVW MEDS BY RX/DR IN RCRD: CPT | Performed by: INTERNAL MEDICINE

## 2024-07-29 PROCEDURE — 83036 HEMOGLOBIN GLYCOSYLATED A1C: CPT

## 2024-07-29 PROCEDURE — 1036F TOBACCO NON-USER: CPT | Performed by: INTERNAL MEDICINE

## 2024-07-29 PROCEDURE — 36415 COLL VENOUS BLD VENIPUNCTURE: CPT

## 2024-07-29 PROCEDURE — 84443 ASSAY THYROID STIM HORMONE: CPT

## 2024-07-29 PROCEDURE — 3048F LDL-C <100 MG/DL: CPT | Performed by: INTERNAL MEDICINE

## 2024-07-29 PROCEDURE — 4010F ACE/ARB THERAPY RXD/TAKEN: CPT | Performed by: INTERNAL MEDICINE

## 2024-07-29 PROCEDURE — 80061 LIPID PANEL: CPT

## 2024-07-29 PROCEDURE — 3078F DIAST BP <80 MM HG: CPT | Performed by: INTERNAL MEDICINE

## 2024-07-29 PROCEDURE — 1159F MED LIST DOCD IN RCRD: CPT | Performed by: INTERNAL MEDICINE

## 2024-07-29 PROCEDURE — 3075F SYST BP GE 130 - 139MM HG: CPT | Performed by: INTERNAL MEDICINE

## 2024-07-29 PROCEDURE — 86803 HEPATITIS C AB TEST: CPT

## 2024-07-29 PROCEDURE — 1123F ACP DISCUSS/DSCN MKR DOCD: CPT | Performed by: INTERNAL MEDICINE

## 2024-07-29 PROCEDURE — 3051F HG A1C>EQUAL 7.0%<8.0%: CPT | Performed by: INTERNAL MEDICINE

## 2024-07-29 PROCEDURE — 85027 COMPLETE CBC AUTOMATED: CPT

## 2024-07-29 PROCEDURE — 80053 COMPREHEN METABOLIC PANEL: CPT

## 2024-07-29 ASSESSMENT — ENCOUNTER SYMPTOMS
CHEST TIGHTNESS: 0
DIFFICULTY URINATING: 0
RESPIRATORY NEGATIVE: 1
GASTROINTESTINAL NEGATIVE: 1
EYES NEGATIVE: 1
ENDOCRINE NEGATIVE: 1
ABDOMINAL PAIN: 0
HEMATOLOGIC/LYMPHATIC NEGATIVE: 1
DYSURIA: 0
FATIGUE: 0
BLOOD IN STOOL: 0
COUGH: 0
NEUROLOGICAL NEGATIVE: 1
SHORTNESS OF BREATH: 0
DIZZINESS: 0
NAUSEA: 0
UNEXPECTED WEIGHT CHANGE: 0
PSYCHIATRIC NEGATIVE: 1
DIARRHEA: 0
HEADACHES: 0

## 2024-07-29 ASSESSMENT — PATIENT HEALTH QUESTIONNAIRE - PHQ9
2. FEELING DOWN, DEPRESSED OR HOPELESS: NOT AT ALL
SUM OF ALL RESPONSES TO PHQ9 QUESTIONS 1 AND 2: 0
1. LITTLE INTEREST OR PLEASURE IN DOING THINGS: NOT AT ALL

## 2024-07-29 NOTE — ASSESSMENT & PLAN NOTE
Order labs.  Refer to [facility] pharmacist for diabetes management(A1C is worsening)  Follow 1800 cy ADA diet.

## 2024-07-29 NOTE — PROGRESS NOTES
"Subjective   Patient ID: Kevin Borja is a 69 y.o. male who presents for Follow-up (Patient is here for a 4 month follow up. ).    Here to follow up on HTN,HLD,DM-2,taken his meds regularly,no side effect ,he denies any hypoglycemic reaction.  He has been more active and lost few pounds.  he denies any side effects .  he recently retired and \"enjoying his [employer]\".  he had his eye exam within  1 year and had no retinopathy.  He passed a kidney stone few months ago,but lost the stone,was shown to me on his phone,no recurrence of his back pain,he drinks enough water daily.         Review of Systems   Constitutional:  Negative for fatigue and unexpected weight change.   HENT: Negative.  Negative for congestion.    Eyes: Negative.    Respiratory: Negative.  Negative for cough, chest tightness and shortness of breath.    Cardiovascular:  Negative for chest pain and leg swelling.   Gastrointestinal: Negative.  Negative for abdominal pain, blood in stool, diarrhea and nausea.   Endocrine: Negative.    Genitourinary: Negative.  Negative for difficulty urinating and dysuria.   Neurological: Negative.  Negative for dizziness and headaches.   Hematological: Negative.    Psychiatric/Behavioral: Negative.         Objective   /72 (BP Location: Left arm, Patient Position: Sitting)   Pulse 59   Temp 36.7 °C (98 °F) (Temporal)   Wt 115 kg (254 lb 3.2 oz)   SpO2 95%   BMI 41.34 kg/m²     Physical Exam  Vitals reviewed.   Constitutional:       Appearance: Normal appearance.   HENT:      Head: Normocephalic and atraumatic.   Eyes:      Extraocular Movements: Extraocular movements intact.      Pupils: Pupils are equal, round, and reactive to light.   Neck:      Vascular: No carotid bruit.   Cardiovascular:      Rate and Rhythm: Normal rate and regular rhythm.      Heart sounds: Normal heart sounds.   Pulmonary:      Effort: Pulmonary effort is normal. No respiratory distress.      Breath sounds: Normal breath sounds. No " wheezing or rhonchi.   Abdominal:      General: Abdomen is flat. Bowel sounds are normal.      Palpations: Abdomen is soft.      Tenderness: There is no abdominal tenderness.   Musculoskeletal:         General: Normal range of motion.      Cervical back: Normal range of motion and neck supple.   Skin:     General: Skin is warm and dry.   Neurological:      General: No focal deficit present.      Mental Status: He is alert and oriented to person, place, and time.   Psychiatric:         Mood and Affect: Mood normal.         Behavior: Behavior normal.         Assessment/Plan   Problem List Items Addressed This Visit             ICD-10-CM    Benign essential hypertension I10     Stable on current medication.  He has whitecoat syndrome.         Relevant Orders    CBC (Completed)    Comprehensive Metabolic Panel (Completed)    Follow Up In [facility]    Diabetes mellitus type II, controlled (Multi) - Primary E11.9     Order labs.  Refer to [facility] pharmacist for diabetes management(A1C is worsening)  Follow 1800 cy ADA diet.           Relevant Orders    Hemoglobin A1C (Completed)    Albumin-Creatinine Ratio, Urine Random    Follow Up In [facility]    Hyperlipidemia E78.5     Follow low-fat diet.  Continue statin.         Relevant Orders    Lipid Panel (Completed)    TSH with reflex to Free T4 if abnormal (Completed)    Class 3 severe obesity due to excess calories with serious comorbidity and body mass index (BMI) of 40.0 to 44.9 in adult (Multi) E66.01, Z68.41    Stage 3a chronic kidney disease (Multi) N18.31     Avoid NSAID intake.  Monitor creatinine periodically.         Screening for prostate cancer Z12.5    Relevant Orders    PSA, Total and Free    Need for hepatitis C screening test Z11.59    Relevant Orders    Hepatitis C Antibody (Completed)

## 2024-11-17 DIAGNOSIS — I10 BENIGN ESSENTIAL HYPERTENSION: ICD-10-CM

## 2024-11-17 DIAGNOSIS — E78.5 HYPERLIPIDEMIA, UNSPECIFIED HYPERLIPIDEMIA TYPE: ICD-10-CM

## 2024-11-17 DIAGNOSIS — E11.9 CONTROLLED TYPE 2 DIABETES MELLITUS WITHOUT COMPLICATION, WITHOUT LONG-TERM CURRENT USE OF INSULIN (MULTI): ICD-10-CM

## 2024-11-18 DIAGNOSIS — E11.9 CONTROLLED TYPE 2 DIABETES MELLITUS WITHOUT COMPLICATION, WITHOUT LONG-TERM CURRENT USE OF INSULIN (MULTI): ICD-10-CM

## 2024-11-18 RX ORDER — METOPROLOL SUCCINATE 100 MG/1
100 TABLET, EXTENDED RELEASE ORAL DAILY
Qty: 90 TABLET | Refills: 3 | Status: SHIPPED | OUTPATIENT
Start: 2024-11-18

## 2024-11-18 RX ORDER — SITAGLIPTIN 50 MG/1
50 TABLET, FILM COATED ORAL DAILY
Qty: 90 TABLET | Refills: 3 | Status: SHIPPED | OUTPATIENT
Start: 2024-11-18

## 2024-11-18 RX ORDER — LISINOPRIL 40 MG/1
40 TABLET ORAL DAILY
Qty: 90 TABLET | Refills: 3 | Status: SHIPPED | OUTPATIENT
Start: 2024-11-18

## 2024-11-18 RX ORDER — METFORMIN HYDROCHLORIDE 1000 MG/1
1000 TABLET ORAL 2 TIMES DAILY
Qty: 180 TABLET | Refills: 3 | Status: SHIPPED | OUTPATIENT
Start: 2024-11-18

## 2024-11-18 RX ORDER — GLYBURIDE 5 MG/1
5 TABLET ORAL 2 TIMES DAILY
Qty: 180 TABLET | Refills: 3 | Status: SHIPPED | OUTPATIENT
Start: 2024-11-18

## 2024-11-18 RX ORDER — AMLODIPINE BESYLATE 5 MG/1
5 TABLET ORAL DAILY
Qty: 90 TABLET | Refills: 3 | Status: SHIPPED | OUTPATIENT
Start: 2024-11-18

## 2024-11-18 RX ORDER — PIOGLITAZONEHYDROCHLORIDE 45 MG/1
45 TABLET ORAL DAILY
Qty: 90 TABLET | Refills: 3 | Status: SHIPPED | OUTPATIENT
Start: 2024-11-18

## 2024-11-18 RX ORDER — ATORVASTATIN CALCIUM 10 MG/1
10 TABLET, FILM COATED ORAL DAILY
Qty: 90 TABLET | Refills: 3 | Status: SHIPPED | OUTPATIENT
Start: 2024-11-18

## 2024-11-18 RX ORDER — SPIRONOLACTONE 25 MG/1
25 TABLET ORAL DAILY
Qty: 90 TABLET | Refills: 3 | Status: SHIPPED | OUTPATIENT
Start: 2024-11-18

## 2024-12-03 ENCOUNTER — APPOINTMENT (OUTPATIENT)
Dept: PRIMARY CARE | Facility: CLINIC | Age: 69
End: 2024-12-03
Payer: MEDICARE

## 2024-12-03 ENCOUNTER — LAB (OUTPATIENT)
Dept: LAB | Facility: LAB | Age: 69
End: 2024-12-03
Payer: MEDICARE

## 2024-12-03 ENCOUNTER — HOSPITAL ENCOUNTER (OUTPATIENT)
Dept: RADIOLOGY | Facility: CLINIC | Age: 69
Discharge: HOME | End: 2024-12-03
Payer: MEDICARE

## 2024-12-03 VITALS
WEIGHT: 258.4 LBS | RESPIRATION RATE: 18 BRPM | BODY MASS INDEX: 42.02 KG/M2 | TEMPERATURE: 97.8 F | HEART RATE: 78 BPM | SYSTOLIC BLOOD PRESSURE: 137 MMHG | DIASTOLIC BLOOD PRESSURE: 75 MMHG | OXYGEN SATURATION: 97 %

## 2024-12-03 DIAGNOSIS — E66.01 CLASS 3 SEVERE OBESITY DUE TO EXCESS CALORIES WITH SERIOUS COMORBIDITY AND BODY MASS INDEX (BMI) OF 40.0 TO 44.9 IN ADULT: ICD-10-CM

## 2024-12-03 DIAGNOSIS — N18.31 STAGE 3A CHRONIC KIDNEY DISEASE (MULTI): ICD-10-CM

## 2024-12-03 DIAGNOSIS — M25.562 CHRONIC PAIN OF LEFT KNEE: ICD-10-CM

## 2024-12-03 DIAGNOSIS — E11.9 CONTROLLED TYPE 2 DIABETES MELLITUS WITHOUT COMPLICATION, WITHOUT LONG-TERM CURRENT USE OF INSULIN (MULTI): Primary | ICD-10-CM

## 2024-12-03 DIAGNOSIS — Z23 NEED FOR INFLUENZA VACCINATION: ICD-10-CM

## 2024-12-03 DIAGNOSIS — E66.01 MORBID OBESITY WITH BODY MASS INDEX (BMI) OF 40.0 TO 49.9 (MULTI): ICD-10-CM

## 2024-12-03 DIAGNOSIS — I10 BENIGN ESSENTIAL HYPERTENSION: ICD-10-CM

## 2024-12-03 DIAGNOSIS — Z12.5 SCREENING FOR PROSTATE CANCER: ICD-10-CM

## 2024-12-03 DIAGNOSIS — G89.29 CHRONIC PAIN OF LEFT KNEE: ICD-10-CM

## 2024-12-03 DIAGNOSIS — Z13.6 SCREENING FOR HEART DISEASE: ICD-10-CM

## 2024-12-03 DIAGNOSIS — E78.5 HYPERLIPIDEMIA, UNSPECIFIED HYPERLIPIDEMIA TYPE: ICD-10-CM

## 2024-12-03 DIAGNOSIS — E11.9 CONTROLLED TYPE 2 DIABETES MELLITUS WITHOUT COMPLICATION, WITHOUT LONG-TERM CURRENT USE OF INSULIN (MULTI): ICD-10-CM

## 2024-12-03 DIAGNOSIS — E66.813 CLASS 3 SEVERE OBESITY DUE TO EXCESS CALORIES WITH SERIOUS COMORBIDITY AND BODY MASS INDEX (BMI) OF 40.0 TO 44.9 IN ADULT: ICD-10-CM

## 2024-12-03 LAB
CREAT UR-MCNC: 97.5 MG/DL (ref 20–370)
MICROALBUMIN UR-MCNC: 8.6 MG/L
MICROALBUMIN/CREAT UR: 8.8 UG/MG CREAT
POC FINGERSTICK BLOOD GLUCOSE: 106 MG/DL (ref 70–100)
POC HEMOGLOBIN A1C: 6.1 % (ref 4.2–6.5)

## 2024-12-03 PROCEDURE — G2211 COMPLEX E/M VISIT ADD ON: HCPCS | Performed by: INTERNAL MEDICINE

## 2024-12-03 PROCEDURE — 99214 OFFICE O/P EST MOD 30 MIN: CPT | Performed by: INTERNAL MEDICINE

## 2024-12-03 PROCEDURE — 1160F RVW MEDS BY RX/DR IN RCRD: CPT | Performed by: INTERNAL MEDICINE

## 2024-12-03 PROCEDURE — G0447 BEHAVIOR COUNSEL OBESITY 15M: HCPCS | Performed by: INTERNAL MEDICINE

## 2024-12-03 PROCEDURE — 3051F HG A1C>EQUAL 7.0%<8.0%: CPT | Performed by: INTERNAL MEDICINE

## 2024-12-03 PROCEDURE — 73562 X-RAY EXAM OF KNEE 3: CPT | Mod: LEFT SIDE | Performed by: RADIOLOGY

## 2024-12-03 PROCEDURE — 3061F NEG MICROALBUMINURIA REV: CPT | Performed by: INTERNAL MEDICINE

## 2024-12-03 PROCEDURE — 90662 IIV NO PRSV INCREASED AG IM: CPT | Performed by: INTERNAL MEDICINE

## 2024-12-03 PROCEDURE — 1123F ACP DISCUSS/DSCN MKR DOCD: CPT | Performed by: INTERNAL MEDICINE

## 2024-12-03 PROCEDURE — 3078F DIAST BP <80 MM HG: CPT | Performed by: INTERNAL MEDICINE

## 2024-12-03 PROCEDURE — 3048F LDL-C <100 MG/DL: CPT | Performed by: INTERNAL MEDICINE

## 2024-12-03 PROCEDURE — 4010F ACE/ARB THERAPY RXD/TAKEN: CPT | Performed by: INTERNAL MEDICINE

## 2024-12-03 PROCEDURE — 1159F MED LIST DOCD IN RCRD: CPT | Performed by: INTERNAL MEDICINE

## 2024-12-03 PROCEDURE — 3075F SYST BP GE 130 - 139MM HG: CPT | Performed by: INTERNAL MEDICINE

## 2024-12-03 PROCEDURE — 82962 GLUCOSE BLOOD TEST: CPT | Performed by: INTERNAL MEDICINE

## 2024-12-03 PROCEDURE — G0008 ADMIN INFLUENZA VIRUS VAC: HCPCS | Performed by: INTERNAL MEDICINE

## 2024-12-03 PROCEDURE — 73562 X-RAY EXAM OF KNEE 3: CPT | Mod: LT

## 2024-12-03 PROCEDURE — 83036 HEMOGLOBIN GLYCOSYLATED A1C: CPT | Performed by: INTERNAL MEDICINE

## 2024-12-03 ASSESSMENT — PATIENT HEALTH QUESTIONNAIRE - PHQ9
1. LITTLE INTEREST OR PLEASURE IN DOING THINGS: NOT AT ALL
2. FEELING DOWN, DEPRESSED OR HOPELESS: NOT AT ALL
SUM OF ALL RESPONSES TO PHQ9 QUESTIONS 1 AND 2: 0

## 2024-12-03 NOTE — PROGRESS NOTES
Subjective   Patient ID: Kevin Borja is a 69 y.o. male who presents for Follow-up (Patient is here for a 4 month follow up. ) and Knee Pain (Patient also complains of left knee pain for over a month now. Patient has tried heat and cold therapy and lotion but nothing is working. ).    Here to follow up on HTN,HLD,DM-2,taken his meds regularly,no side effect ,he denies any hypoglycemic reaction.  He takes his meds regularly,no side effect,his insurance did not cover SGL2 inhibitor in past.  He c/o left knee pain for over 1 month,affecting his gait,he put on several pounds,no h/o injury,has been taking Advil and wearing a brace.  He did not go for his cardiac ca score,ordered end of 2023  he had his eye exam within  1 year and had no retinopathy.  He passed a kidney stone earlier this year,but lost the stone,was shown to me on his phone,no recurrence of his back pain,he drinks enough water daily.         Review of Systems   Constitutional:  Negative for fatigue and unexpected weight change.        Patient gained weight.   HENT: Negative.  Negative for congestion.    Eyes: Negative.    Respiratory: Negative.  Negative for cough, chest tightness and shortness of breath.    Cardiovascular:  Negative for chest pain and leg swelling.   Gastrointestinal: Negative.  Negative for abdominal pain, blood in stool, diarrhea and nausea.   Endocrine: Negative.    Genitourinary: Negative.  Negative for difficulty urinating and dysuria.   Musculoskeletal:         Left knee pain   Neurological: Negative.  Negative for dizziness and headaches.   Hematological: Negative.    Psychiatric/Behavioral: Negative.         Objective   /75 (BP Location: Right arm, Patient Position: Sitting, BP Cuff Size: Large adult)   Pulse 78   Temp 36.6 °C (97.8 °F) (Temporal)   Resp 18   Wt 117 kg (258 lb 6.4 oz)   SpO2 97%   BMI 42.02 kg/m²     Physical Exam  Vitals reviewed.   Constitutional:       Appearance: Normal appearance.   HENT:       Head: Normocephalic and atraumatic.   Eyes:      Extraocular Movements: Extraocular movements intact.      Pupils: Pupils are equal, round, and reactive to light.   Neck:      Vascular: No carotid bruit.   Cardiovascular:      Rate and Rhythm: Normal rate and regular rhythm.      Heart sounds: Normal heart sounds.   Pulmonary:      Effort: Pulmonary effort is normal. No respiratory distress.      Breath sounds: Normal breath sounds. No wheezing or rhonchi.   Abdominal:      General: Abdomen is flat. Bowel sounds are normal.      Palpations: Abdomen is soft.      Tenderness: There is no abdominal tenderness.   Musculoskeletal:      Cervical back: Normal range of motion and neck supple.      Comments: There is OA changes and crepitus of the left knee, angulation,  Limited flexion due to pain.  Slow gait.   Lymphadenopathy:      Cervical: No cervical adenopathy.   Skin:     General: Skin is warm and dry.   Neurological:      General: No focal deficit present.      Mental Status: He is alert and oriented to person, place, and time.   Psychiatric:         Mood and Affect: Mood normal.         Behavior: Behavior normal.         Assessment/Plan   Problem List Items Addressed This Visit             ICD-10-CM    Benign essential hypertension I10     Stable on current medication.  He has whitecoat syndrome.         Diabetes mellitus type II, controlled (Multi) - Primary E11.9     Order labs.  Refer to [facility] pharmacist for diabetes management(A1C is worsening)  Follow 1800 cy ADA diet.  Consider switching Januvia to Ozempic, which will also help his weight.  Consider adding Jardiance.  Follow-up in 3 months.           Relevant Orders    POCT glycosylated hemoglobin (Hb A1C) manually resulted (Completed)    POCT Fingerstick Glucose manually resulted (Completed)    Basic metabolic panel    Hemoglobin A1C    Referral to Clinical Pharmacy    Hyperlipidemia E78.5     Follow low-fat diet.  Continue statin.         Class 3 severe  obesity due to excess calories with serious comorbidity and body mass index (BMI) of 40.0 to 44.9 in adult E66.813, E66.01, Z68.41     I spent >15 minutes face to face with individual providing recommendations for nutrition choices and exercise plan to help achieve weight reduction.         Stage 3a chronic kidney disease (Multi) N18.31     Avoid NSAID intake.  Monitor creatinine periodically.  Consider jardiance(was not covered in past),but will refer to our pharmacist.         Chronic pain of left knee M25.562, G89.29     Suspect OA.  Order XRAY.  Refer to ortho         Relevant Orders    XR knee left 3 views (Completed)    Referral to Orthopaedic Surgery     Other Visit Diagnoses         Codes    Need for influenza vaccination     Z23    Relevant Orders    Flu vaccine, trivalent, preservative free, HIGH-DOSE, age 65y+ (Fluzone) (Completed)    Morbid obesity with body mass index (BMI) of 40.0 to 49.9 (Multi)     E66.01    Relevant Orders    Referral to Clinical Pharmacy    Screening for heart disease     Z13.6    Relevant Orders    CT cardiac scoring wo IV contrast

## 2024-12-05 LAB
PSA FREE MFR SERPL: 13 %
PSA FREE SERPL-MCNC: 0.2 NG/ML
PSA SERPL IA-MCNC: 1.5 NG/ML (ref 0–4)

## 2024-12-08 ASSESSMENT — ENCOUNTER SYMPTOMS
GASTROINTESTINAL NEGATIVE: 1
RESPIRATORY NEGATIVE: 1
BLOOD IN STOOL: 0
DIFFICULTY URINATING: 0
ENDOCRINE NEGATIVE: 1
SHORTNESS OF BREATH: 0
HEMATOLOGIC/LYMPHATIC NEGATIVE: 1
DIARRHEA: 0
NEUROLOGICAL NEGATIVE: 1
HEADACHES: 0
PSYCHIATRIC NEGATIVE: 1
UNEXPECTED WEIGHT CHANGE: 0
DIZZINESS: 0
DYSURIA: 0
ABDOMINAL PAIN: 0
FATIGUE: 0
EYES NEGATIVE: 1
COUGH: 0
CHEST TIGHTNESS: 0
NAUSEA: 0

## 2024-12-09 NOTE — ASSESSMENT & PLAN NOTE
Avoid NSAID intake.  Monitor creatinine periodically.  Consider jardiance(was not covered in past),but will refer to our pharmacist.

## 2024-12-09 NOTE — ASSESSMENT & PLAN NOTE
Order labs.  Refer to [facility] pharmacist for diabetes management(A1C is worsening)  Follow 1800 cy ADA diet.  Consider switching Januvia to Ozempic, which will also help his weight.  Consider adding Jardiance.  Follow-up in 3 months.

## 2024-12-09 NOTE — ASSESSMENT & PLAN NOTE
I spent >15 minutes face to face with individual providing recommendations for nutrition choices and exercise plan to help achieve weight reduction.   Please follow up with your primary care provider concerning your visit today.  Please return to the Emergency Department for any other concerns.

## 2025-01-29 ENCOUNTER — OFFICE VISIT (OUTPATIENT)
Dept: ORTHOPEDIC SURGERY | Facility: CLINIC | Age: 70
End: 2025-01-29
Payer: MEDICARE

## 2025-01-29 DIAGNOSIS — G89.29 CHRONIC PAIN OF LEFT KNEE: ICD-10-CM

## 2025-01-29 DIAGNOSIS — M25.562 CHRONIC PAIN OF LEFT KNEE: ICD-10-CM

## 2025-01-29 DIAGNOSIS — M17.11 PRIMARY OSTEOARTHRITIS OF RIGHT KNEE: Primary | ICD-10-CM

## 2025-01-29 PROCEDURE — 99213 OFFICE O/P EST LOW 20 MIN: CPT | Performed by: PHYSICAL MEDICINE & REHABILITATION

## 2025-01-29 PROCEDURE — 1160F RVW MEDS BY RX/DR IN RCRD: CPT | Performed by: PHYSICAL MEDICINE & REHABILITATION

## 2025-01-29 PROCEDURE — 1159F MED LIST DOCD IN RCRD: CPT | Performed by: PHYSICAL MEDICINE & REHABILITATION

## 2025-01-29 PROCEDURE — 1036F TOBACCO NON-USER: CPT | Performed by: PHYSICAL MEDICINE & REHABILITATION

## 2025-01-29 PROCEDURE — 99203 OFFICE O/P NEW LOW 30 MIN: CPT | Performed by: PHYSICAL MEDICINE & REHABILITATION

## 2025-01-29 PROCEDURE — 4010F ACE/ARB THERAPY RXD/TAKEN: CPT | Performed by: PHYSICAL MEDICINE & REHABILITATION

## 2025-01-29 PROCEDURE — 1123F ACP DISCUSS/DSCN MKR DOCD: CPT | Performed by: PHYSICAL MEDICINE & REHABILITATION

## 2025-01-29 SDOH — SOCIAL STABILITY: SOCIAL NETWORK: SOCIAL ACTIVITY:: 2

## 2025-01-29 NOTE — PROGRESS NOTES
New Consult/New Patient Note    1/29/2025   Rosario Gamble MD    Assessment:  Very pleasant 68 y/o male presents with acute on chronic left knee pain that significantly impairs daily activities. X-ray of left knee from 12/2024 showed significant medial compartment narrowing. Symptoms and exam findings most consistent with left knee osteoarthritis.  Recent severe exacerbation without trauma or obvious injury  - Left Knee Osteoarthritis-medial compartment  -Suspect possible meniscal injury which is improving versus patellar subluxation    PLAN:  1)  Imaging/Diagnostic Studies: X-rays of left knee from December 2024 personally reviewed.   2)  Therapy/Rehabilitation: Will order physical therapy for vikas-joint muscle strengthening. OK to use knee sleeve for comfort and as needed.   3)  Pharmacological Management: Agree with over the counter tylenol or diclofenac gel as needed.   4)  Spine/Surgical Interventions: None at this time.  5)  Alternative Treatments: May consider alternative treatment options in the future including manipulation (chiropractor versus osteopathic) and/or acupuncture if patient does not obtain optimal relief with initial treatment plan.  6)  Consultations:  Physical therapy  7)  Follow -up: as needed  8)  Future treatment considerations: Intraarticular knee joint injection    Patient advised of the difference between hurt and harm and advised to continue with all normal activities and exercises. Patient verbalized understanding of the above plan and was happy with the care provided.      The above clinical summary has been dictated with voice recognition software. It has not been proofread for grammatical errors, typographical mistakes, or other semantic inconsistencies.    Thank you for visiting our office today. It was our pleasure to take part in your healthcare.     Do not hesitate to call with any questions regarding your plan of care after leaving at [phone]    To clinicians, thank  you very much for this kind referral. It is a privilege to partner with you in the care of your patients. My office would be delighted to assist you with any further consultations or with questions regarding the plan of care outlined. Do not hesitate to call the office or contact me directly.     Sincerely,    Nick Webster, DO  PMR PGY-4  [facility]    Seen with resident Dr. Webster, note above and below is a joint effort in all areas.    I saw and evaluated the patient. I personally obtained the key and critical portions of the history and physical exam. I reviewed the resident's documentation and discussed the patient with the resident. I agree with the resident's medical decision making as documented in the resident's note, with my additions.      RANDI Gauthier MD  , Physical Medicine and Rehabilitation, Orthopedic Spine  [facility] [employer]  [facility]         Kevin Borja   is a 69 y.o. male with a PMHx of diabetes who presents with left knee pain that started in October 2024 without any inciting event. Pain has significantly subsided since the initial acute onset. Denies any giving out of his knee, weakness, numbness, or falls.    Location: anterior-medial left knee  Radiation: none  Quality: dull ache at times, sharp at others; current 0-1/10,  at its worst  2/10  Exacerbated by work, use, stairs  Relieved by rest  Onset, traumatic event: October 2024  Has tried: heat, ice, left knee sleeve      Valsalva sign is negative  Grocery cart sign is negative  Smoker:  none  Does not wake them at night  Litigation: none     Patient denies bowel/bladder incontinence, denies fever, denies unintentional weight loss, denies clumsiness of hands, feet, or dropping things.  Denies any constitutional or myelopathic symptomatology.      PREVIOUS TREATMENTS  IN THE LAST SIX MONTHS     Active conservative therapy  in the last six months  (see below)              1. Physical therapy:  none                                                                                   2. Home exercise program after PT:  none                                                    3. A physician supervised home exercise program (HEP): none                4. Chiropractic Care:  none                                                                   Passive conservative therapy  in the last six months (see below)              1. NSAIDS:   none                                                                                                        2. Prescription pain medication:  none                                                            3. Acupuncture: none                                                                                         4. Tens unit: none     Assistive Devices: none    Work status: retired      ROS: Other than listed in HPI, PMHX below, and intake paperwork including a 30 point patient-recorded review of symptoms which was personally reviewed and inclusive of no history of unintentional weight loss, change in appetite, significant malaise, fevers, chills, or change in bowel/bladder, shortness of breath, or chest pain.    I have confirmed and edited as necessary Past Medical, Past Surgical, Family, Social History and ROS as obtained by others. These were also obtained on new patient forms.      PHYSICAL EXAM:   GENERAL APPEARANCE:  Well nourished, well developed, and no apparent distress.  NEURO PSYCH: Patient oriented to person, place, Mood pleasant. Benign affect.  MUSCULOSKELETAL and NEUROLOGICAL   MUSCLE BULK: Normal and symmetrical in the upper & lower extremities.  MUSCLE TONE: Normal  MOTOR: 5/5 in all muscle groups tested in bilateral upper and lower extremities   SENSORY: Normal sensory exam to light touch  GAIT: Normal.  Able to go up and heels and toes with no sig. weakness.  No sig. balance deficit appreciated  REFLEXES: +2 to  bilateral U/L extremities  PERIPHERAL JOINT ROM:   HIP ROM: Full bilaterally  Hip Exam including thigh thrust and LOG ROLL: Negative bilaterally  Knee: Normal inspection, no erythema, no swelling, Non-tender to palpation of medial or lateral joint lines, full ROM in flexion and extension without pain,  No pain or laxity with valgus or varus stress testing  Christina: negative  Lachman's: negative      DATA REVIEW:   The below imaging studies were personally reviewed and discussed with the patient.    Medical Decision Making:  The above note constitutes a Moderate to High level of medical decision making based on past data and imaging review, new and chronic symptoms with exacerbation, change in weakness or sensation, new imaging and diagnostic studies ordered, discussion of potential interventional or surgical treatment options, acute or chronic pain that may pose a threat to bodily function.    Past Medical History:   Diagnosis Date    Personal history of other diseases of the digestive system 07/26/2013    History of hemorrhoids    Personal history of other infectious and parasitic diseases     History of herpes zoster    Personal history of urinary calculi 02/07/2018    History of renal calculi       Medication Documentation Review Audit       Reviewed by Rosario Gamble MD (Physician) on 12/03/24 at 0944      Medication Order Taking? Sig Documenting Provider Last Dose Status   amLODIPine (Norvasc) 5 mg tablet [id number] Yes TAKE 1 TABLET ONCE DAILY Rosario Gamlbe MD  Active   aspirin 81 mg EC tablet [id number] Yes Take 1 tablet (81 mg) by mouth once daily. Historical Provider, MD  Active   atorvastatin (Lipitor) 10 mg tablet [id number] Yes TAKE 1 TABLET ONCE DAILY Rosario Gamble MD  Active   fluoride, sodium, (Prevident 5000 Booster) 1.1 % dental paste [id number] Yes Apply to teeth once daily. Use as directed Historical Provider, MD  Active   glyBURIDE (Diabeta) 5 mg tablet [id number] Yes TAKE 1 TABLET TWICE A  DAY Rosario Gamble MD  Active   Januvia 50 mg tablet [id number] Yes TAKE 1 TABLET ONCE DAILY Rosario Gamble MD  Active   lancets 30 gauge misc [id number] Yes 90 Lancets once daily. USE TO TEST ONCE DAILY Rosario Gamble MD  Active   lisinopril 40 mg tablet [id number] Yes TAKE 1 TABLET ONCE DAILY Rosario Gamble MD  Active   metFORMIN (Glucophage) 1,000 mg tablet [id number] Yes TAKE 1 TABLET TWICE A DAY Rosario Gamble MD  Active   metoprolol succinate XL (Toprol-XL) 100 mg 24 hr tablet [id number] Yes TAKE 1 TABLET ONCE DAILY Rosario Gamble MD  Active   pioglitazone (Actos) 45 mg tablet [id number] Yes TAKE 1 TABLET ONCE DAILY Rosario Gamble MD  Active   spironolactone (Aldactone) 25 mg tablet [id number] Yes TAKE 1 TABLET ONCE DAILY Rosario Gamble MD  Active                    No Known Allergies    Social History     Socioeconomic History    Marital status: Single     Spouse name: Not on file    Number of children: 1    Years of education: Not on file    Highest education level: Not on file   Occupational History    Not on file   Tobacco Use    Smoking status: Never    Smokeless tobacco: Never   Vaping Use    Vaping status: Never Used   Substance and Sexual Activity    Alcohol use: Yes     Comment: On days off of work    Drug use: Never    Sexual activity: Not on file   Other Topics Concern    Not on file   Social History Narrative    Not on file     Social Drivers of Health     Financial Resource Strain: Not on file   Food Insecurity: Not on file   Transportation Needs: Not on file   Physical Activity: Not on file   Stress: Not on file   Social Connections: Not on file   Intimate Partner Violence: Not on file   Housing Stability: Not on file       Past Surgical History:   Procedure Laterality Date    COLONOSCOPY  06/07/2013    Complete Colonoscopy    OTHER SURGICAL HISTORY  04/28/2013    Nerve Block    VASECTOMY  04/28/2013    Surgery Vas Deferens Vasectomy

## 2025-03-05 ENCOUNTER — EVALUATION (OUTPATIENT)
Dept: PHYSICAL THERAPY | Facility: CLINIC | Age: 70
End: 2025-03-05
Payer: MEDICARE

## 2025-03-05 DIAGNOSIS — M17.11 PRIMARY OSTEOARTHRITIS OF RIGHT KNEE: ICD-10-CM

## 2025-03-05 DIAGNOSIS — G89.29 CHRONIC PAIN OF LEFT KNEE: ICD-10-CM

## 2025-03-05 DIAGNOSIS — M25.562 CHRONIC PAIN OF LEFT KNEE: ICD-10-CM

## 2025-03-05 PROCEDURE — 97161 PT EVAL LOW COMPLEX 20 MIN: CPT | Mod: GP | Performed by: PHYSICAL THERAPIST

## 2025-03-05 PROCEDURE — 97535 SELF CARE MNGMENT TRAINING: CPT | Mod: GP | Performed by: PHYSICAL THERAPIST

## 2025-03-05 ASSESSMENT — PAIN DESCRIPTION - DESCRIPTORS: DESCRIPTORS: BURNING

## 2025-03-05 ASSESSMENT — PAIN - FUNCTIONAL ASSESSMENT: PAIN_FUNCTIONAL_ASSESSMENT: 0-10

## 2025-03-05 ASSESSMENT — ENCOUNTER SYMPTOMS
LOSS OF SENSATION IN FEET: 0
DEPRESSION: 0
OCCASIONAL FEELINGS OF UNSTEADINESS: 1

## 2025-03-05 ASSESSMENT — PAIN SCALES - GENERAL: PAINLEVEL_OUTOF10: 0 - NO PAIN

## 2025-03-05 NOTE — LETTER
March 5, 2025    Oseas Bell, PT  [address] [place], [address] [address]    Patient: Kevin Borja   YOB: 1955   Date of Visit: 3/5/2025       Dear Gee Gauthier MD  [address] [place] [address]    The attached plan of care is being sent to you because your patient’s medical reimbursement requires that you certify the plan of care. Your signature is required to allow uninterrupted insurance coverage.      You may indicate your approval by signing below and faxing this form back to us at Dept Fax: [phone].    Please call Dept: [phone] with any questions or concerns.    Thank you for this referral,        Oseas Bell, PT  [place] [address] [facility]  [address] [facility] [address]    Payer: Payor: [facility] MEDICARE / Plan: [facility] MEDICARE ADVANTAGE / Product Type: *No Product type* /                                                                         Date:     Dear Oseas Bell, PT,     Re: Mr. Kevin Borja, MRN:[id number]    I certify that I have reviewed the attached plan of care and it is medically necessary for Mr. Kevin Borja (1955) who is under my care.          ______________________________________                    _________________  Provider name and credentials                                           Date and time                                                                                           Plan of Care 3/5/25   Effective from: 3/5/2025  Effective to: 6/3/2025    Plan ID: [id number]            Participants as of Finalize on 3/5/2025    Name Type Comments Contact Info    Gee Gauthier MD Referring Provider  [phone]    Oseas Bell, PT Physical Therapist  [phone]       Last Plan Note     Author: Oseas Bell PT Status: Incomplete Last edited: 3/5/2025  4:15 PM       PT Initial Evaluation    Patient Name:  Kevin RAMIREZ  Huong    MRN:  [id number]    :  1955    Today's Date:  25    Time Calculation  Start Time: 1615  Stop Time: 1650  Time Calculation (min): 35 min  PT Evaluation Time Entry  PT Evaluation (Low) Time Entry: 15  PT Therapeutic Procedures Time Entry  Self-Care/Home Mgmt Trainin       Informed Consent  Patient has been informed of all evaluation findings and treatment plans and agrees to participate in Physical Therapy services and plans as outlined.    Diagnosis:  Diagnosis and Precautions: M25.562,G89.29 (ICD-10-CM) - Chronic pain of left knee  M17.11 (ICD-10-CM) - Primary osteoarthritis of right knee    Goals:   1. Strength and Endurance  Goal: Within 6 weeks, the patient’s quadriceps and hamstring strength will improve from 4-/5 to 4+/5 on the Manual Muscle Test (MMT), allowing performance of 10 controlled squats with proper form and pain <=3/10.  2. Neuromuscular Control, Proprioception, and Coordination  Goal: Within 4 weeks, the patient will demonstrate improved knee stability by maintaining single-leg balance on the affected side for 10 seconds without compensatory movements or excessive wobble.  3. Functional Activities / ADLs  Goal: Within 4 weeks, the patient will independently perform daily tasks (e.g., going up and down stairs, sitting-to-standing transfers) with no more than 2/10 knee pain and minimal reliance on handrails or support.  4. Education, Self-Management, and Compliance  Goal: By the second therapy visit, the patient will accurately demonstrate their prescribed knee home exercise program (HEP), understanding proper form, frequency, and how to modify exercises if pain or fatigue increases.  5. Long-Term Maintenance and Prevention  Goal: By discharge (approximately 5-6 weeks), the patient will establish a regular knee exercise routine, maintain proper mechanics during daily tasks and sports, and experience no significant pain flare-ups (>3/10) during usual activities.     Plan of  Care:      Treatment/Interventions: Aquatic therapy, Cryotherapy, Education/ Instruction, Electrical stimulation, Gait training, Manual therapy, Neuromuscular re-education, Self care/ home management, Therapeutic activities, Taping techniques, Therapeutic exercises, Ultrasound, Vasopneumatic device  PT Plan: Skilled PT  PT Frequency:  (next visit in 3 weeks)  Duration: 3-4 visits     Certification Period Start Date: 03/05/25  Certification Period End Date: 06/03/25  Number of Treatments Authorized: 3-4  Rehab Potential: Good  Plan of Care Agreement: Patient    PT Assessment:    Patient is a 69 y.o. MALE with c/o bilateral knee pain (left > right).   Patient is alert and oriented x 3.  Patient presents with medical diagnosis of M25.562,G89.29 (ICD-10-CM) - Chronic pain of left knee M17.11 (ICD-10-CM) - Primary osteoarthritis of right knee  contributing to compensatory soft tissue dysfunction, pain, stiffness and weakness of the L knee.   Significant past medical history/past surgical history includes see below.    Skilled care is needed to progress the patient back to these activities without exacerbating symptoms.   Patient requires skilled PT services to address the problems identified and the individualized patient's goals as outlined in the problems and goals section of this evaluation.  A skilled PT is required to address these key impairments and to provide and progress with an appropriate home exercise program. Patient does have any significant PMH influencing Rx and reports motivation to return to FUNCTIONAL ACTIVITY.   Patient demonstrates to be a good candidate for physical therapy with good rehab potential and verbalized a good understanding of HIS diagnosis, prognosis and treatment.  Goals have been established and reviewed with the patient.      PT Assessment Results: Decreased strength, Decreased endurance, Decreased mobility, Pain  Rehab Prognosis: Good  Evaluation/Treatment Tolerance: Patient  tolerated treatment well    Complexity:  Low complexity evaluation  due to a 15 minute duration, a past medical history WITH any personal factors and/or comorbidities that could impact the POC, examination of body systems completed on one to two elements, the patient presents with a stable condition, and clinical decision making using the LEFS was of low complexity.     Prognosis:  Rehab Prognosis: Good    Problem List  Activity Limitations, Decreased Functional Level, Decreased knowledge of HEP, Gait issues, Pain, Range of Motion/joint mobility issues, Strength, and Endurance    Impairments   IMPAIRED STRENGTH LOWER BODY, IMPAIRED CORE STABILITY, INCREASED PAIN, IMPAIRED FUNCTIONAL ACTIVITY LEVEL, and IMPAIRED FUNCTIONAL MOVEMENT PATTERNS    Functional Limitations:  LIMITATIONS PERFORMING BASIC ADL'S, PARTICIPATION IN HOBBIES, PARTICIPATION IN LEISURE ACTIVITIES, and PARTICIPATION IN HOME MANAGEMENT    General Visit Information:  Reason for Referral: PT Evaluation  Referred By: Dr. Gee Gauthier  General Comment: M25.562,G89.29 (ICD-10-CM) - Chronic pain of left knee  M17.11 (ICD-10-CM) - Primary osteoarthritis of right knee    Pre-Cautions:  KAZADI Fall Risk Score (The score of 4 or more indicates an increased risk of falling): 0     Medical Precautions:  (HTN, high cholesterol, diabetes))     Reason for Visit:  PT Evaluate and Treat    Initial Evaluation:  Referred By: Dr. Gee Gauthier    Insurance  Insurance reviewed  Name of Insurance:  [employer]  Visit No.  1  * (EVAL-MUST PRIOR AUTH AFTER EVAL!!!) CHRONIC PAIN OF LEFT KNEE M25.562; PRIMARY OA RIGHT KNEE M17.11; [employer] MEDICARE ADV: 0% COINSUR // $30 COPAY // 4150 OOP // PRIOR AUTH 1V 3/5/25 - 3/19/25 AUTH#: [id number] // [email or web address] REF#: [id number]     Subjective:    Current Episode  Date of Onset:  6 months ago  Mechanism of injury:  arthritic changes of the L knee  Chief Complaint:  L knee pain, weakness  Progression of symptoms:   worsening  Previous treatments:  L knee x-ray    Pain Score:  Pain Assessment: 0-10  Pain Assessment  Pain Assessment: 0-10  0-10 (Numeric) Pain Score: 0 - No pain (5/10 worst)  Pain Type: Chronic pain  Pain Location: Knee  Pain Orientation: Right, Left  Pain Descriptors: Burning  Pain Frequency: Intermittent  Pain Onset: Ongoing  Pain Type: Chronic pain  Pain Location: Knee  Pain Orientation: Right, Left  Pain Descriptors: Burning  Pain Frequency: Intermittent    Better with:  none    Worse with:  stairs, standing, walking, squatting, kneeling, lunging    Medical History/Surgical History:  Medical Precautions:  (HTN, high cholesterol, diabetes)    Reviewed medical history form with patient (medications/allergies reviewed with patient).  Current Outpatient Medications   Medication Instructions   • amLODIPine (NORVASC) 5 mg, oral, Daily   • aspirin 81 mg, Daily   • atorvastatin (LIPITOR) 10 mg, oral, Daily   • fluoride, sodium, (Prevident 5000 Booster) 1.1 % dental paste Daily   • glyBURIDE (DIABETA) 5 mg, oral, 2 times daily   • Januvia 50 mg, oral, Daily   • lancets 30 gauge misc 90 Lancets, miscellaneous, Daily, USE TO TEST ONCE DAILY   • lisinopril 40 mg, oral, Daily   • metFORMIN (GLUCOPHAGE) 1,000 mg, oral, 2 times daily   • metoprolol succinate XL (TOPROL-XL) 100 mg, oral, Daily   • pioglitazone (ACTOS) 45 mg, oral, Daily   • spironolactone (ALDACTONE) 25 mg, oral, Daily     Radiology:    Exam Information    Status Exam Begun Exam Ended   Final 12/03/2024 10:28 12/03/2024 10:34     Study Result    Narrative & Impression   Interpreted By:  Shawna Rausch,   STUDY:  Left knee, three views      INDICATION:  Signs/Symptoms:left knee pain,suspect OA.      COMPARISON:  None.      ACCESSION NUMBER(S):  [id number]      ORDERING CLINICIAN:  MAINOR RODAS      FINDINGS:  No acute fracture. Varus angulation of the knee.      Severe medial compartment osteoarthrosis with joint space loss and  osteophytes.      No  significant knee joint effusion.  Soft tissues are unremarkable.      IMPRESSION:  1. Severe medial compartment osteoarthrosis with a varus angulation  of the knee.     Functional Assessment:  Level of [place]:  Level of [place]: Independent with ADLs and functional transfers    Social History:    Work Status:  RETIRED  Patient Awareness:  Patient is aware of HIS diagnosis and prognosis.  Social Support/History:  LIVES ALONE    Objective:    Weightbearing Status:  WBAT L LE    Skin:  skin intact over bilateral knees    Palpation:   no point tenderness over either knee    Sensation:  Patient denies numbness/tingling of bilateral LOWER extremities.    Gait:  mild antalgic gait left LE    Lower Extremity Movement Testing:  Squat- no increased L knee pain with UE support  Lunge- no increased L knee pain with UE support    ROM:  AROM  L knee flexion/extension -5-120 degrees  R knee flexion/extension -6-120 degrees  R hip AROM WNL's, L hip AROM WNL's, R ankle/foot AROM WNL's, and L ankle/foot AROM WNL's    Strength:    L knee flexion/extension 4-/5 each  R knee flexion/extension 4-/5 each  R hip 5/5 all planes, L hip 5/5 all planes, R ankle/foot 5/5 all planes, and L ankle/foot 5/5 all planes    Treatment  Time in clinic started at  4:15pm  Time in clinic ended at  4:50pm  Total time in clinic is . 35 minutes  Total timed code time is  35 minutes    Treatment Performed Today:.   PT Initial Evaluation and Self-Care/Home Management HEP  Individual(s) Educated: Patient  Education Provided: Home Exercise Program  Diagnosis and Precautions: M25.562,G89.29 (ICD-10-CM) - Chronic pain of left knee  M17.11 (ICD-10-CM) - Primary osteoarthritis of right knee  Risk and Benefits Discussed with Patient/Caregiver/Other: yes  Patient/Caregiver Demonstrated Understanding: yes  Plan of Care Discussed and Agreed Upon: yes  Patient Response to Education: Patient/Caregiver Verbalized Understanding of Information, Patient/Caregiver  Performed Return Demonstration of Exercises/Activities, Patient/Caregiver Asked Appropriate Questions    Patient instructed in a home exercise program, has been given handouts for each of the exercises performed and was given another sheet instructing patient in the amount of reps to perform and the chuy to follow while doing the exercises     Access Code: [email or web address]  URL: [email or web address]  Date: 03/05/2025  Prepared by: Oseas Bell    Exercises  - Mini Squat  - 1 x daily - 4-5 x weekly - 2 sets - 10 reps - 5-10 hold (use UE support for a few weeks)  - Mini Lunge  - 1 x daily - 4-5 x weekly - 2 sets - 10 reps - 5-10 hold (advised to use UE support for a few weeks)  - Step Up  - 1 x daily - 4-5 x weekly - 2 sets - 10 reps - 5-10 hold  - Lateral Step Up  - 1 x daily - 4-5 x weekly - 2 sets - 10 reps - 5-10 hold  - Standing Heel Raise  - 1 x daily - 4-5 x weekly - 2 sets - 10 reps - 5-10 hold  - Standing Terminal Knee Extension with Resistance  - 1 x daily - 3-4 x weekly - 3 sets - 10 reps - 5-10 hold  - Supine Bridge  - 1 x daily - 3-4 x weekly - 2 sets - 10 reps - 5-10 hold  - Supine Straight Leg Raises  - 1 x daily - 3-4 x weekly - 2 sets - 10 reps - 5-10 hold  - Seated Long Arc Quad  - 1 x daily - 3-4 x weekly - 2 sets - 10 reps - 5-10 hold  - Supine Knee Extension Strengthening  - 1 x daily - 3-4 x weekly - 2 sets - 10 reps - 5-10 hold         Current Participants as of 3/5/2025    Name Type Comments Contact Info    Gee Gauthier MD Referring Provider  [phone]    Signature pending    Oseas Bell PT Physical Therapist  [phone]    Signature pending

## 2025-03-05 NOTE — PROGRESS NOTES
PT Initial Evaluation    Patient Name:  Kevin Borja    MRN:  [id number]    :  1955    Today's Date:  25    Time Calculation  Start Time:   Stop Time: 1650  Time Calculation (min): 35 min  PT Evaluation Time Entry  PT Evaluation (Low) Time Entry: 15  PT Therapeutic Procedures Time Entry  Self-Care/Home Mgmt Trainin       Informed Consent  Patient has been informed of all evaluation findings and treatment plans and agrees to participate in Physical Therapy services and plans as outlined.    Diagnosis:  Diagnosis and Precautions: M25.562,G89.29 (ICD-10-CM) - Chronic pain of left knee  M17.11 (ICD-10-CM) - Primary osteoarthritis of right knee    Goals:   1. Strength and Endurance  Goal: Within 6 weeks, the patient’s quadriceps and hamstring strength will improve from 4-/5 to 4+/5 on the Manual Muscle Test (MMT), allowing performance of 10 controlled squats with proper form and pain <=3/10.  2. Neuromuscular Control, Proprioception, and Coordination  Goal: Within 4 weeks, the patient will demonstrate improved knee stability by maintaining single-leg balance on the affected side for 10 seconds without compensatory movements or excessive wobble.  3. Functional Activities / ADLs  Goal: Within 4 weeks, the patient will independently perform daily tasks (e.g., going up and down stairs, sitting-to-standing transfers) with no more than 2/10 knee pain and minimal reliance on handrails or support.  4. Education, Self-Management, and Compliance  Goal: By the second therapy visit, the patient will accurately demonstrate their prescribed knee home exercise program (HEP), understanding proper form, frequency, and how to modify exercises if pain or fatigue increases.  5. Long-Term Maintenance and Prevention  Goal: By discharge (approximately 5-6 weeks), the patient will establish a regular knee exercise routine, maintain proper mechanics during daily tasks and sports, and experience no significant pain flare-ups  (>3/10) during usual activities.     Plan of Care:      Treatment/Interventions: Aquatic therapy, Cryotherapy, Education/ Instruction, Electrical stimulation, Gait training, Manual therapy, Neuromuscular re-education, Self care/ home management, Therapeutic activities, Taping techniques, Therapeutic exercises, Ultrasound, Vasopneumatic device  PT Plan: Skilled PT  PT Frequency:  (next visit in 3 weeks)  Duration: 3-4 visits     Certification Period Start Date: 03/05/25  Certification Period End Date: 06/03/25  Number of Treatments Authorized: 3-4  Rehab Potential: Good  Plan of Care Agreement: Patient    PT Assessment:    Patient is a 69 y.o. MALE with c/o bilateral knee pain (left > right).   Patient is alert and oriented x 3.  Patient presents with medical diagnosis of M25.562,G89.29 (ICD-10-CM) - Chronic pain of left knee M17.11 (ICD-10-CM) - Primary osteoarthritis of right knee  contributing to compensatory soft tissue dysfunction, pain, stiffness and weakness of the L knee.   Significant past medical history/past surgical history includes see below.    Skilled care is needed to progress the patient back to these activities without exacerbating symptoms.   Patient requires skilled PT services to address the problems identified and the individualized patient's goals as outlined in the problems and goals section of this evaluation.  A skilled PT is required to address these key impairments and to provide and progress with an appropriate home exercise program. Patient does have any significant PMH influencing Rx and reports motivation to return to FUNCTIONAL ACTIVITY.   Patient demonstrates to be a good candidate for physical therapy with good rehab potential and verbalized a good understanding of HIS diagnosis, prognosis and treatment.  Goals have been established and reviewed with the patient.      PT Assessment Results: Decreased strength, Decreased endurance, Decreased mobility, Pain  Rehab Prognosis:  Good  Evaluation/Treatment Tolerance: Patient tolerated treatment well    Complexity:  Low complexity evaluation  due to a 15 minute duration, a past medical history WITH any personal factors and/or comorbidities that could impact the POC, examination of body systems completed on one to two elements, the patient presents with a stable condition, and clinical decision making using the LEFS was of low complexity.     Prognosis:  Rehab Prognosis: Good    Problem List  Activity Limitations, Decreased Functional Level, Decreased knowledge of HEP, Gait issues, Pain, Range of Motion/joint mobility issues, Strength, and Endurance    Impairments   IMPAIRED STRENGTH LOWER BODY, IMPAIRED CORE STABILITY, INCREASED PAIN, IMPAIRED FUNCTIONAL ACTIVITY LEVEL, and IMPAIRED FUNCTIONAL MOVEMENT PATTERNS    Functional Limitations:  LIMITATIONS PERFORMING BASIC ADL'S, PARTICIPATION IN HOBBIES, PARTICIPATION IN LEISURE ACTIVITIES, and PARTICIPATION IN HOME MANAGEMENT    General Visit Information:  Reason for Referral: PT Evaluation  Referred By: Dr. Gee Gauthier  General Comment: M25.562,G89.29 (ICD-10-CM) - Chronic pain of left knee  M17.11 (ICD-10-CM) - Primary osteoarthritis of right knee    Pre-Cautions:  ANTONIO Fall Risk Score (The score of 4 or more indicates an increased risk of falling): 0     Medical Precautions:  (HTN, high cholesterol, diabetes))     Reason for Visit:  PT Evaluate and Treat    Initial Evaluation:  Referred By: Dr. Gee Gauthier    Insurance  Insurance reviewed  Name of Insurance:  [place] [employer]  Visit No.  1  * (EVAL-MUST PRIOR AUTH AFTER EVAL!!!) CHRONIC PAIN OF LEFT KNEE M25.562; PRIMARY OA RIGHT KNEE M17.11; [employer] MEDICARE ADV: 0% COINSUR // $30 COPAY // 4150 OOP // PRIOR AUTH 1V 3/5/25 - 3/19/25 AUTH#: [id number] // [email or web address] REF#: [id number]     Subjective:    Current Episode  Date of Onset:  6 months ago  Mechanism of injury:  arthritic changes of the L knee  Chief Complaint:  L knee  pain, weakness  Progression of symptoms:  worsening  Previous treatments:  L knee x-ray    Pain Score:  Pain Assessment: 0-10  Pain Assessment  Pain Assessment: 0-10  0-10 (Numeric) Pain Score: 0 - No pain (5/10 worst)  Pain Type: Chronic pain  Pain Location: Knee  Pain Orientation: Right, Left  Pain Descriptors: Burning  Pain Frequency: Intermittent  Pain Onset: Ongoing  Pain Type: Chronic pain  Pain Location: Knee  Pain Orientation: Right, Left  Pain Descriptors: Burning  Pain Frequency: Intermittent    Better with:  none    Worse with:  stairs, standing, walking, squatting, kneeling, lunging    Medical History/Surgical History:  Medical Precautions:  (HTN, high cholesterol, diabetes)    Reviewed medical history form with patient (medications/allergies reviewed with patient).  Current Outpatient Medications   Medication Instructions    amLODIPine (NORVASC) 5 mg, oral, Daily    aspirin 81 mg, Daily    atorvastatin (LIPITOR) 10 mg, oral, Daily    fluoride, sodium, (Prevident 5000 Booster) 1.1 % dental paste Daily    glyBURIDE (DIABETA) 5 mg, oral, 2 times daily    Januvia 50 mg, oral, Daily    lancets 30 gauge misc 90 Lancets, miscellaneous, Daily, USE TO TEST ONCE DAILY    lisinopril 40 mg, oral, Daily    metFORMIN (GLUCOPHAGE) 1,000 mg, oral, 2 times daily    metoprolol succinate XL (TOPROL-XL) 100 mg, oral, Daily    pioglitazone (ACTOS) 45 mg, oral, Daily    spironolactone (ALDACTONE) 25 mg, oral, Daily     Radiology:    Exam Information    Status Exam Begun Exam Ended   Final 12/03/2024 10:28 12/03/2024 10:34     Study Result    Narrative & Impression   Interpreted By:  Shawna Rausch,   STUDY:  Left knee, three views      INDICATION:  Signs/Symptoms:left knee pain,suspect OA.      COMPARISON:  None.      ACCESSION NUMBER(S):  [id number]      ORDERING CLINICIAN:  MAINOR RODAS      FINDINGS:  No acute fracture. Varus angulation of the knee.      Severe medial compartment osteoarthrosis with joint space loss  and  osteophytes.      No significant knee joint effusion.  Soft tissues are unremarkable.      IMPRESSION:  1. Severe medial compartment osteoarthrosis with a varus angulation  of the knee.     Functional Assessment:  Level of Mayes:  Level of Mayes: Independent with ADLs and functional transfers    Social History:    Work Status:  RETIRED  Patient Awareness:  Patient is aware of HIS diagnosis and prognosis.  Social Support/History:  LIVES ALONE    Objective:    Weightbearing Status:  WBAT L LE    Skin:  skin intact over bilateral knees    Palpation:   no point tenderness over either knee    Sensation:  Patient denies numbness/tingling of bilateral LOWER extremities.    Gait:  mild antalgic gait left LE    Lower Extremity Movement Testing:  Squat- no increased L knee pain with UE support  Lunge- no increased L knee pain with UE support    ROM:  AROM  L knee flexion/extension -5-120 degrees  R knee flexion/extension -6-120 degrees  R hip AROM WNL's, L hip AROM WNL's, R ankle/foot AROM WNL's, and L ankle/foot AROM WNL's    Strength:    L knee flexion/extension 4-/5 each  R knee flexion/extension 4-/5 each  R hip 5/5 all planes, L hip 5/5 all planes, R ankle/foot 5/5 all planes, and L ankle/foot 5/5 all planes    Treatment  Time in clinic started at  4:15pm  Time in clinic ended at  4:50pm  Total time in clinic is . 35 minutes  Total timed code time is  35 minutes    Treatment Performed Today:.   PT Initial Evaluation and Self-Care/Home Management HEP  Individual(s) Educated: Patient  Education Provided: Home Exercise Program  Diagnosis and Precautions: M25.562,G89.29 (ICD-10-CM) - Chronic pain of left knee  M17.11 (ICD-10-CM) - Primary osteoarthritis of right knee  Risk and Benefits Discussed with Patient/Caregiver/Other: yes  Patient/Caregiver Demonstrated Understanding: yes  Plan of Care Discussed and Agreed Upon: yes  Patient Response to Education: Patient/Caregiver Verbalized Understanding of  Information, Patient/Caregiver Performed Return Demonstration of Exercises/Activities, Patient/Caregiver Asked Appropriate Questions    Patient instructed in a home exercise program, has been given handouts for each of the exercises performed and was given another sheet instructing patient in the amount of reps to perform and the chuy to follow while doing the exercises     Access Code: [email or web address]  URL: [email or web address]  Date: 03/05/2025  Prepared by: Oseas Bell    Exercises  - Mini Squat  - 1 x daily - 4-5 x weekly - 2 sets - 10 reps - 5-10 hold (use UE support for a few weeks)  - Mini Lunge  - 1 x daily - 4-5 x weekly - 2 sets - 10 reps - 5-10 hold (advised to use UE support for a few weeks)  - Step Up  - 1 x daily - 4-5 x weekly - 2 sets - 10 reps - 5-10 hold  - Lateral Step Up  - 1 x daily - 4-5 x weekly - 2 sets - 10 reps - 5-10 hold  - Standing Heel Raise  - 1 x daily - 4-5 x weekly - 2 sets - 10 reps - 5-10 hold  - Standing Terminal Knee Extension with Resistance  - 1 x daily - 3-4 x weekly - 3 sets - 10 reps - 5-10 hold  - Supine Bridge  - 1 x daily - 3-4 x weekly - 2 sets - 10 reps - 5-10 hold  - Supine Straight Leg Raises  - 1 x daily - 3-4 x weekly - 2 sets - 10 reps - 5-10 hold  - Seated Long Arc Quad  - 1 x daily - 3-4 x weekly - 2 sets - 10 reps - 5-10 hold  - Supine Knee Extension Strengthening  - 1 x daily - 3-4 x weekly - 2 sets - 10 reps - 5-10 hold

## 2025-03-19 ENCOUNTER — APPOINTMENT (OUTPATIENT)
Dept: PRIMARY CARE | Facility: CLINIC | Age: 70
End: 2025-03-19
Payer: MEDICARE

## 2025-03-19 VITALS
HEART RATE: 64 BPM | HEIGHT: 65 IN | WEIGHT: 256.8 LBS | SYSTOLIC BLOOD PRESSURE: 124 MMHG | TEMPERATURE: 98.1 F | BODY MASS INDEX: 42.78 KG/M2 | OXYGEN SATURATION: 96 % | DIASTOLIC BLOOD PRESSURE: 68 MMHG

## 2025-03-19 DIAGNOSIS — Z00.00 ROUTINE GENERAL MEDICAL EXAMINATION AT HEALTH CARE FACILITY: ICD-10-CM

## 2025-03-19 DIAGNOSIS — Z00.00 ANNUAL PHYSICAL EXAM: ICD-10-CM

## 2025-03-19 DIAGNOSIS — I10 BENIGN ESSENTIAL HYPERTENSION: ICD-10-CM

## 2025-03-19 DIAGNOSIS — N18.31 STAGE 3A CHRONIC KIDNEY DISEASE (MULTI): ICD-10-CM

## 2025-03-19 DIAGNOSIS — E11.9 CONTROLLED TYPE 2 DIABETES MELLITUS WITHOUT COMPLICATION, WITHOUT LONG-TERM CURRENT USE OF INSULIN (MULTI): ICD-10-CM

## 2025-03-19 DIAGNOSIS — E78.5 HYPERLIPIDEMIA, UNSPECIFIED HYPERLIPIDEMIA TYPE: ICD-10-CM

## 2025-03-19 DIAGNOSIS — Z00.00 MEDICARE ANNUAL WELLNESS VISIT, SUBSEQUENT: Primary | ICD-10-CM

## 2025-03-19 DIAGNOSIS — D22.9 MULTIPLE NEVI: ICD-10-CM

## 2025-03-19 DIAGNOSIS — Z12.11 COLON CANCER SCREENING: ICD-10-CM

## 2025-03-19 LAB
ANION GAP SERPL CALCULATED.4IONS-SCNC: 10 MMOL/L (CALC) (ref 7–17)
BUN SERPL-MCNC: 24 MG/DL (ref 7–25)
BUN/CREAT SERPL: ABNORMAL (CALC) (ref 6–22)
CALCIUM SERPL-MCNC: 9.6 MG/DL (ref 8.6–10.3)
CHLORIDE SERPL-SCNC: 103 MMOL/L (ref 98–110)
CO2 SERPL-SCNC: 27 MMOL/L (ref 20–32)
CREAT SERPL-MCNC: 1.19 MG/DL (ref 0.7–1.35)
EGFRCR SERPLBLD CKD-EPI 2021: 67 ML/MIN/1.73M2
EST. AVERAGE GLUCOSE BLD GHB EST-MCNC: 123 MG/DL
EST. AVERAGE GLUCOSE BLD GHB EST-SCNC: 6.8 MMOL/L
GLUCOSE SERPL-MCNC: 52 MG/DL (ref 65–99)
HBA1C MFR BLD: 5.9 % OF TOTAL HGB
POTASSIUM SERPL-SCNC: 4.9 MMOL/L (ref 3.5–5.3)
SODIUM SERPL-SCNC: 140 MMOL/L (ref 135–146)

## 2025-03-19 PROCEDURE — 99397 PER PM REEVAL EST PAT 65+ YR: CPT | Performed by: INTERNAL MEDICINE

## 2025-03-19 PROCEDURE — 3074F SYST BP LT 130 MM HG: CPT | Performed by: INTERNAL MEDICINE

## 2025-03-19 PROCEDURE — 1036F TOBACCO NON-USER: CPT | Performed by: INTERNAL MEDICINE

## 2025-03-19 PROCEDURE — 4010F ACE/ARB THERAPY RXD/TAKEN: CPT | Performed by: INTERNAL MEDICINE

## 2025-03-19 PROCEDURE — 3078F DIAST BP <80 MM HG: CPT | Performed by: INTERNAL MEDICINE

## 2025-03-19 PROCEDURE — 1160F RVW MEDS BY RX/DR IN RCRD: CPT | Performed by: INTERNAL MEDICINE

## 2025-03-19 PROCEDURE — G0439 PPPS, SUBSEQ VISIT: HCPCS | Performed by: INTERNAL MEDICINE

## 2025-03-19 PROCEDURE — 1159F MED LIST DOCD IN RCRD: CPT | Performed by: INTERNAL MEDICINE

## 2025-03-19 PROCEDURE — 93000 ELECTROCARDIOGRAM COMPLETE: CPT | Performed by: INTERNAL MEDICINE

## 2025-03-19 PROCEDURE — 99214 OFFICE O/P EST MOD 30 MIN: CPT | Performed by: INTERNAL MEDICINE

## 2025-03-19 PROCEDURE — G0447 BEHAVIOR COUNSEL OBESITY 15M: HCPCS | Performed by: INTERNAL MEDICINE

## 2025-03-19 PROCEDURE — 1123F ACP DISCUSS/DSCN MKR DOCD: CPT | Performed by: INTERNAL MEDICINE

## 2025-03-19 PROCEDURE — 1170F FXNL STATUS ASSESSED: CPT | Performed by: INTERNAL MEDICINE

## 2025-03-19 PROCEDURE — 3008F BODY MASS INDEX DOCD: CPT | Performed by: INTERNAL MEDICINE

## 2025-03-19 ASSESSMENT — ENCOUNTER SYMPTOMS
EYES NEGATIVE: 1
ENDOCRINE NEGATIVE: 1
HEMATOLOGIC/LYMPHATIC NEGATIVE: 1
RESPIRATORY NEGATIVE: 1
UNEXPECTED WEIGHT CHANGE: 0
DIARRHEA: 0
ABDOMINAL PAIN: 0
NAUSEA: 0
PSYCHIATRIC NEGATIVE: 1
DIFFICULTY URINATING: 0
COUGH: 0
CHEST TIGHTNESS: 0
NEUROLOGICAL NEGATIVE: 1
DIZZINESS: 0
DYSURIA: 0
SHORTNESS OF BREATH: 0
BLOOD IN STOOL: 0
FATIGUE: 0
GASTROINTESTINAL NEGATIVE: 1
HEADACHES: 0

## 2025-03-19 ASSESSMENT — ACTIVITIES OF DAILY LIVING (ADL)
GROCERY_SHOPPING: INDEPENDENT
DRESSING: INDEPENDENT
MANAGING_FINANCES: INDEPENDENT
BATHING: INDEPENDENT
DOING_HOUSEWORK: INDEPENDENT
TAKING_MEDICATION: INDEPENDENT

## 2025-03-19 NOTE — ASSESSMENT & PLAN NOTE
Complete physical examination completed today.  Advised to keep a heart healthy, low-fat diet as recommended diet is the Mediterranean diet.  Advised to exercise regularly for 30 minutes daily 5 days a week and maintain 150 minutes of exercise per week.  Discussed age-appropriate cancer screening,Immunization and recommendation were given.  Advised on regular eye and dental visit.  Advised on staying well-hydrated.  I recommend RSV vaccine at the pharmacy  Will refer to dermatology for general skin examination

## 2025-03-19 NOTE — PROGRESS NOTES
"Subjective   Reason for Visit: Kevin Borja is an 69 y.o. male here for a Medicare Wellness visit.     Past Medical, Surgical, and Family History reviewed and updated in chart.    Reviewed all medications by prescribing practitioner or clinical pharmacist (such as prescriptions, OTCs, herbal therapies and supplements) and documented in the medical record.    Here for MCR and complete physical and to follow up on HTN,HLD,DM-2,taken his meds regularly,no side effect ,he denies any hypoglycemic reaction.  He has been going PT for his left knee,feels better.  He takes his meds regularly,no side effect.  He did not go for his cardiac ca score,ordered 12/2024  he had his eye exam last year and had no retinopathy,will make appt. for this year  He passed a kidney stone earlier last year,but lost the stone,was shown to me on his phone,no recurrence of his back pain,he drinks enough water daily.  COLONOSCOPY: 5/24/2013   Fit test 2023  Last PSA 12/3/2024        Patient Care Team:  Rosario Gamble MD as PCP - General  Rosario Gamble MD as PCP - [employer] PCP     Review of Systems   Constitutional:  Negative for fatigue and unexpected weight change.   HENT: Negative.  Negative for congestion.    Eyes: Negative.    Respiratory: Negative.  Negative for cough, chest tightness and shortness of breath.    Cardiovascular:  Negative for chest pain and leg swelling.   Gastrointestinal: Negative.  Negative for abdominal pain, blood in stool, diarrhea and nausea.   Endocrine: Negative.    Genitourinary: Negative.  Negative for difficulty urinating and dysuria.   Musculoskeletal:         Left knee pain,improving   Neurological: Negative.  Negative for dizziness and headaches.   Hematological: Negative.    Psychiatric/Behavioral: Negative.         Objective   Vitals:  /68 (BP Location: Left arm, Patient Position: Sitting)   Pulse 64   Temp 36.7 °C (98.1 °F) (Temporal)   Ht 1.66 m (5' 5.35\")   Wt 116 kg (256 " lb 12.8 oz)   SpO2 96%   BMI 42.27 kg/m²       Physical Exam  Vitals reviewed.   Constitutional:       General: He is not in acute distress.     Appearance: Normal appearance. He is obese.   HENT:      Head: Normocephalic and atraumatic.      Right Ear: Tympanic membrane, ear canal and external ear normal. There is no impacted cerumen.      Left Ear: Tympanic membrane, ear canal and external ear normal. There is no impacted cerumen.      Mouth/Throat:      Mouth: Mucous membranes are moist.      Pharynx: No oropharyngeal exudate or posterior oropharyngeal erythema.   Eyes:      General: No scleral icterus.     Extraocular Movements: Extraocular movements intact.      Pupils: Pupils are equal, round, and reactive to light.   Neck:      Vascular: No carotid bruit.   Cardiovascular:      Rate and Rhythm: Normal rate and regular rhythm.      Pulses: Normal pulses.           Dorsalis pedis pulses are 2+ on the right side and 2+ on the left side.        Posterior tibial pulses are 2+ on the right side and 2+ on the left side.      Heart sounds: Normal heart sounds. No murmur heard.  Pulmonary:      Effort: Pulmonary effort is normal. No respiratory distress.      Breath sounds: Normal breath sounds. No wheezing.   Abdominal:      General: Abdomen is flat. Bowel sounds are normal.      Palpations: Abdomen is soft.      Tenderness: There is no right CVA tenderness or left CVA tenderness.   Musculoskeletal:         General: Normal range of motion.      Cervical back: Normal range of motion and neck supple.      Right lower leg: No edema.      Left lower leg: No edema.   Feet:      Right foot:      Skin integrity: Skin integrity normal. No ulcer, callus or dry skin.      Toenail Condition: Right toenails are normal.      Left foot:      Skin integrity: No ulcer, callus or dry skin.      Toenail Condition: Left toenails are normal.      Comments: Proprioceptive normal  Lymphadenopathy:      Cervical: No cervical adenopathy.    Skin:     General: Skin is warm.      Comments: Multiple nevi's on back   Neurological:      General: No focal deficit present.      Mental Status: He is alert and oriented to person, place, and time.   Psychiatric:         Mood and Affect: Mood normal.         Assessment & Plan  Routine general medical examination at health care facility    Orders:    1 Year Follow Up In [facility] - PCP - Wellness Exam; Future    Hyperlipidemia, unspecified hyperlipidemia type    Orders:    ECG 12 lead (Clinic Performed)    Colon cancer screening    Orders:    Cologuard® colon cancer screening; Future    Annual physical exam  Complete physical examination completed today.  Advised to keep a heart healthy, low-fat diet as recommended diet is the Mediterranean diet.  Advised to exercise regularly for 30 minutes daily 5 days a week and maintain 150 minutes of exercise per week.  Discussed age-appropriate cancer screening,Immunization and recommendation were given.  Advised on regular eye and dental visit.  Advised on staying well-hydrated.  I recommend RSV vaccine at the pharmacy  Will refer to dermatology for general skin examination       Multiple nevi    Orders:    Referral to Dermatology    Body mass index (BMI) 40.0-44.9, adult (Multi)  I spent >15 minutes face to face with individual providing recommendations for nutrition choices and exercise plan to help achieve weight reduction.         Controlled type 2 diabetes mellitus without complication, without long-term current use of insulin (Multi)  Stable on current medication, A1c improved.         Stage 3a chronic kidney disease (Multi)  GFR 57, await yesterday's lab, avoid NSAID, keep well-hydrated         Medicare annual wellness visit, subsequent         Benign essential hypertension  Stable on current medication.  He has whitecoat syndrome.

## 2025-03-27 ENCOUNTER — TREATMENT (OUTPATIENT)
Dept: PHYSICAL THERAPY | Facility: CLINIC | Age: 70
End: 2025-03-27
Payer: MEDICARE

## 2025-03-27 DIAGNOSIS — M17.11 PRIMARY OSTEOARTHRITIS OF RIGHT KNEE: ICD-10-CM

## 2025-03-27 DIAGNOSIS — M25.562 CHRONIC PAIN OF LEFT KNEE: ICD-10-CM

## 2025-03-27 DIAGNOSIS — G89.29 CHRONIC PAIN OF LEFT KNEE: ICD-10-CM

## 2025-03-27 PROCEDURE — 97110 THERAPEUTIC EXERCISES: CPT | Mod: GP | Performed by: PHYSICAL THERAPIST

## 2025-03-27 ASSESSMENT — PAIN - FUNCTIONAL ASSESSMENT: PAIN_FUNCTIONAL_ASSESSMENT: 0-10

## 2025-03-27 NOTE — PROGRESS NOTES
PHYSICAL THERAPY TREATMENT    Patient name:  Kevin Borja    MRN:  [id number]    :  1955    Today's Date:  25    Time Calculation  Start Time:   Stop Time: 1038  Time Calculation (min): 38 min     PT Therapeutic Procedures Time Entry  Therapeutic Exercise Time Entry: 38       Referral by:  Referred By: Dr. Gee Gauthier    Diagnoses:  Diagnosis and Precautions: M25.562,G89.29 (ICD-10-CM) - Chronic pain of left knee  M17.11 (ICD-10-CM) - Primary osteoarthritis of right knee    Assessment/Plan:  Therapeutic exercise performed in order to improve bilateral knee strength/ROM/mobility.  Patient requires increased tactile/verbal cues with exercise in order to reduce bilateral knee stress/strain during the particular exercise performed.  Patient challenged with exercises performed in clinic secondary to bilateral knee fatigue.   PT Assessment  PT Assessment Results: Decreased strength, Decreased endurance, Decreased mobility, Pain  Rehab Prognosis: Good  OP PT Plan  PT Plan: Skilled PT  Rehab Potential: Good  Plan of Care Agreement: Patient    General Visit Information  PT  Visit  PT Received On: 25    General  Reason for Referral: PT Evaluation  Referred By: Dr. Gee Gauthier  General Comment: M25.562,G89.29 (ICD-10-CM) - Chronic pain of left knee  M17.11 (ICD-10-CM) - Primary osteoarthritis of right knee    Insurance  Insurance reviewed  Name of Insurance:  [employer]  Visit No.  2  * (EVAL-MUST PRIOR AUTH AFTER EVAL!!!) CHRONIC PAIN OF LEFT KNEE M25.562; PRIMARY OA RIGHT KNEE M17.11; [employer] MEDICARE ADV: 0% COINSUR // $30 COPAY // 4150 OOP // PRIOR AUTH 1V 3/5/25 - 3/19/25 AUTH#: [id number] // [email or web address] REF#: [id number]   APPROVED FOR 6V 3/27/25 - 25       Subjective:  Patient reports that his home exercises are going well.    Precautions  Medical Precautions:  (HTN, high cholesterol, diabetes)    Pain:  Pain Assessment  Pain Assessment: 0-10  0-10 (Numeric) Pain Score:   (R knee 0/10, L knee 2/10)  Pain Type: Chronic pain  Pain Location: Knee  Pain Orientation: Right, Left    LEFS 57/80    Treatments    Therapeutic Exercise  Therapeutic Exercise Performed: Yes  Therapeutic Exercise Activity 1: Recumbent bike x 7 minutes, resistance 7.0  Therapeutic Exercise Activity 2: Forward lunge on step x 20 reps  Therapeutic Exercise Activity 3: Mini-Squats x 20 reps  Therapeutic Exercise Activity 4: Forward step-ups 6 inch step, x 20 reps R and L  Therapeutic Exercise Activity 5: Lateral step-ups 6 inch step, x 20 reps R and L  Therapeutic Exercise Activity 6: Tapdowns 4 inch step, x 20 reps R and L  Therapeutic Exercise Activity 7: Heel raises x 30 reps  Therapeutic Exercise Activity 8: star squat x 3 directions x 5 each R and L  Therapeutic Exercise Activity 9: Wall squat x 20 reps  Therapeutic Exercise Activity 10: Cybex Leg Press 2 plates, x 30 reps    Patient instructed in a home exercise program, has been given handouts for each of the exercises performed and was given another sheet instructing patient in the amount of reps to perform and the chuy to follow while doing the exercises     Access Code: [email or web address]  URL: [email or web address]  Date: 03/27/2025  Prepared by: Oseas Bell    Exercises  - Wall Squat  - 1 x daily - 3-4 x weekly - 2 sets - 10 reps - 5-10 hold  - Single Leg Balance with Clock Reach  - 1 x daily - 3-4 x weekly - 1 sets - 10 reps - 5-10 hold  - Lateral Step Down  - 1 x daily - 3-4 x weekly - 2 sets - 10 reps - 5-10 hold    Treatment  Time in clinic started at:   10am  Time in clinic ended at:   10:38am  Total time in clinic is:   38 minutes  Total timed code time is:  38 minutes    Treatment Performed Today:.   Therapeutic Exercise x  3 units

## 2025-04-09 LAB — NONINV COLON CA DNA+OCC BLD SCRN STL QL: NEGATIVE

## 2025-04-10 ENCOUNTER — TREATMENT (OUTPATIENT)
Dept: PHYSICAL THERAPY | Facility: CLINIC | Age: 70
End: 2025-04-10
Payer: MEDICARE

## 2025-04-10 DIAGNOSIS — M17.11 PRIMARY OSTEOARTHRITIS OF RIGHT KNEE: ICD-10-CM

## 2025-04-10 DIAGNOSIS — M25.562 CHRONIC PAIN OF LEFT KNEE: ICD-10-CM

## 2025-04-10 DIAGNOSIS — G89.29 CHRONIC PAIN OF LEFT KNEE: ICD-10-CM

## 2025-04-10 PROCEDURE — 97110 THERAPEUTIC EXERCISES: CPT | Mod: GP | Performed by: PHYSICAL THERAPIST

## 2025-04-10 ASSESSMENT — PAIN - FUNCTIONAL ASSESSMENT: PAIN_FUNCTIONAL_ASSESSMENT: 0-10

## 2025-04-10 NOTE — PROGRESS NOTES
PHYSICAL THERAPY TREATMENT    Patient name:  Kevin Borja    MRN:  [id number]    :  1955    Today's Date:  04/10/25    Time Calculation  Start Time: 1445  Stop Time: 1523  Time Calculation (min): 38 min     PT Therapeutic Procedures Time Entry  Therapeutic Exercise Time Entry: 38       Referral by:  Referred By: Dr. Gee Gauthier    Diagnoses:  Diagnosis and Precautions: M25.562,G89.29 (ICD-10-CM) - Chronic pain of left knee  M17.11 (ICD-10-CM) - Primary osteoarthritis of right knee    Goals:   1. Strength and Endurance  Goal: Within 6 weeks, the patient’s quadriceps and hamstring strength will improve from 4-/5 to 4+/5 on the Manual Muscle Test (MMT), allowing performance of 10 controlled squats with proper form and pain <=3/10. Progressing  2. Neuromuscular Control, Proprioception, and Coordination  Goal: Within 4 weeks, the patient will demonstrate improved knee stability by maintaining single-leg balance on the affected side for 10 seconds without compensatory movements or excessive wobble.  Progressing  3. Functional Activities / ADLs  Goal: Within 4 weeks, the patient will independently perform daily tasks (e.g., going up and down stairs, sitting-to-standing transfers) with no more than 2/10 knee pain and minimal reliance on handrails or support.  Mild progression  4. Education, Self-Management, and Compliance  Goal: By the second therapy visit, the patient will accurately demonstrate their prescribed knee home exercise program (HEP), understanding proper form, frequency, and how to modify exercises if pain or fatigue increases.  MET  5. Long-Term Maintenance and Prevention  Goal: By discharge (approximately 5-6 weeks), the patient will establish a regular knee exercise routine, maintain proper mechanics during daily tasks and sports, and experience no significant pain flare-ups (>3/10) during usual activities.  MET    Assessment/Plan:  Patient comes into clinic today for PT Re-Evaluation secondary to  complaints of bilateral knee pain (L > R).  Patient demonstrates fair overall progress with regards to his R/L knee rehab program thus far.  Patient reports he would like continuing with his HEP only at this time.  Patient is independent with his HEP, has met some of his PT goals, and will be discharged from PT at this time.  Therapeutic exercise performed in order to improve bilateral knee strength/ROM/mobility.  Patient requires increased tactile/verbal cues with exercise in order to reduce bilateral knee stress/strain during the particular exercise performed.  Patient challenged with exercises performed in clinic secondary to bilateral knee fatigue.   PT Assessment  PT Assessment Results: Decreased strength, Decreased endurance, Decreased mobility, Pain  Rehab Prognosis: Good  Evaluation/Treatment Tolerance: Patient tolerated treatment well  OP PT Plan  PT Plan: No Additional PT interventions required at this time  PT Frequency:  (DC)  Duration: DC  Rehab Potential: Good  Plan of Care Agreement: Patient    General Visit Information  PT  Visit  PT Received On: 04/10/25    General  Reason for Referral: PT Evaluation  Referred By: Dr. Gee Gauthier  General Comment: M25.562,G89.29 (ICD-10-CM) - Chronic pain of left knee  M17.11 (ICD-10-CM) - Primary osteoarthritis of right knee    Insurance  Insurance reviewed  Name of Insurance:  [employer]  Visit No.  3  * (EVAL-MUST PRIOR AUTH AFTER EVAL!!!) CHRONIC PAIN OF LEFT KNEE M25.562; PRIMARY OA RIGHT KNEE M17.11; [facility] MEDICARE ADV: 0% COINSUR // $30 COPAY // 4150 OOP // PRIOR AUTH 1V 3/5/25 - 3/19/25 AUTH#: [id number] // [email or web address] REF#: [id number]   APPROVED FOR 6V 3/27/25 - 5/25/25       Subjective:  Patient comes into clinic today for PT Re-Evaluation secondary to complaints of bilateral knee pain (L > R).  Patient has no follow-up scheduled with MD at  this time.    Precautions  Medical Precautions:  (HTN, high cholesterol,  diabetes)    Pain:  Pain Assessment  Pain Assessment: 0-10  0-10 (Numeric) Pain Score:  (L knee pain 4/10, R knee pain 0/10)  Pain Type: Chronic pain  Pain Location: Knee  Pain Orientation: Right, Left    Objective:    Palpation:   no point tenderness over either knee    Sensation:  Patient denies numbness/tingling of bilateral LOWER extremities.    Gait:  mild antalgic gait left LE    Lower Extremity Movement Testing:  Squat- no increased L knee pain with UE support  Lunge- no increased L knee pain with UE support  Single leg stance > 10 seconds R and L    ROM:  AROM  L knee flexion/extension -4-123 degrees  R knee flexion/extension -4-120 degrees    Strength:    L knee flexion/extension 4/5 each  R knee flexion/extension 4/5 each    Outcome Measures:  LEFS 52/80    Treatments    Therapeutic Exercise  Therapeutic Exercise Performed: Yes  Therapeutic Exercise Activity 1: Recumbent bike x 7 minutes, resistance 7.0  Therapeutic Exercise Activity 2: Forward lunge on step x 20 reps  Therapeutic Exercise Activity 3: Mini-Squats x 20 reps  Therapeutic Exercise Activity 4: Forward step-ups 6 inch step, x 20 reps R and L  Therapeutic Exercise Activity 5: Lateral step-ups 6 inch step, x 20 reps R and L  Therapeutic Exercise Activity 6: Tapdowns 4 inch step, x 20 reps R and L  Therapeutic Exercise Activity 7: Heel raises x 30 reps  Therapeutic Exercise Activity 8: Cybex Leg Press 2 plates, x 30 reps    Treatment  Time in clinic started at:   2:45pm  Time in clinic ended at:   3:23pm  Total time in clinic is:    38 minutes  Total timed code time is:  38 minutes    Treatment Performed Today:.   Therapeutic Exercise x  3 units

## 2025-07-18 ENCOUNTER — APPOINTMENT (OUTPATIENT)
Dept: PRIMARY CARE | Facility: CLINIC | Age: 70
End: 2025-07-18
Payer: MEDICARE

## 2025-07-18 VITALS
BODY MASS INDEX: 42.7 KG/M2 | HEART RATE: 69 BPM | DIASTOLIC BLOOD PRESSURE: 67 MMHG | TEMPERATURE: 97.6 F | WEIGHT: 259.4 LBS | SYSTOLIC BLOOD PRESSURE: 125 MMHG | OXYGEN SATURATION: 95 %

## 2025-07-18 DIAGNOSIS — M43.10 SPONDYLOLISTHESIS, ACQUIRED: ICD-10-CM

## 2025-07-18 DIAGNOSIS — E66.813 CLASS 3 SEVERE OBESITY DUE TO EXCESS CALORIES WITH SERIOUS COMORBIDITY AND BODY MASS INDEX (BMI) OF 40.0 TO 44.9 IN ADULT: ICD-10-CM

## 2025-07-18 DIAGNOSIS — E78.5 HYPERLIPIDEMIA, UNSPECIFIED HYPERLIPIDEMIA TYPE: ICD-10-CM

## 2025-07-18 DIAGNOSIS — N18.31 STAGE 3A CHRONIC KIDNEY DISEASE (MULTI): ICD-10-CM

## 2025-07-18 DIAGNOSIS — I10 BENIGN ESSENTIAL HYPERTENSION: Primary | ICD-10-CM

## 2025-07-18 DIAGNOSIS — D22.9 MULTIPLE NEVI: ICD-10-CM

## 2025-07-18 DIAGNOSIS — E11.9 CONTROLLED TYPE 2 DIABETES MELLITUS WITHOUT COMPLICATION, WITHOUT LONG-TERM CURRENT USE OF INSULIN: ICD-10-CM

## 2025-07-18 DIAGNOSIS — M17.11 PRIMARY OSTEOARTHRITIS OF RIGHT KNEE: ICD-10-CM

## 2025-07-18 LAB
POC FINGERSTICK BLOOD GLUCOSE: 63 MG/DL (ref 70–100)
POC HEMOGLOBIN A1C: 6.1 % (ref 4.2–6.5)

## 2025-07-18 PROCEDURE — 3078F DIAST BP <80 MM HG: CPT | Performed by: INTERNAL MEDICINE

## 2025-07-18 PROCEDURE — 99215 OFFICE O/P EST HI 40 MIN: CPT | Performed by: INTERNAL MEDICINE

## 2025-07-18 PROCEDURE — 3074F SYST BP LT 130 MM HG: CPT | Performed by: INTERNAL MEDICINE

## 2025-07-18 PROCEDURE — 1159F MED LIST DOCD IN RCRD: CPT | Performed by: INTERNAL MEDICINE

## 2025-07-18 PROCEDURE — 82962 GLUCOSE BLOOD TEST: CPT | Performed by: INTERNAL MEDICINE

## 2025-07-18 PROCEDURE — G2211 COMPLEX E/M VISIT ADD ON: HCPCS | Performed by: INTERNAL MEDICINE

## 2025-07-18 PROCEDURE — 3044F HG A1C LEVEL LT 7.0%: CPT | Performed by: INTERNAL MEDICINE

## 2025-07-18 PROCEDURE — 1160F RVW MEDS BY RX/DR IN RCRD: CPT | Performed by: INTERNAL MEDICINE

## 2025-07-18 PROCEDURE — 4010F ACE/ARB THERAPY RXD/TAKEN: CPT | Performed by: INTERNAL MEDICINE

## 2025-07-18 PROCEDURE — G0447 BEHAVIOR COUNSEL OBESITY 15M: HCPCS | Performed by: INTERNAL MEDICINE

## 2025-07-18 PROCEDURE — 83036 HEMOGLOBIN GLYCOSYLATED A1C: CPT | Performed by: INTERNAL MEDICINE

## 2025-07-18 NOTE — PROGRESS NOTES
Subjective   Patient ID: Kevin Borja is a 70 y.o. male who presents for Follow-up (Patient is here for a 4 month follow up. ).     Here to follow up on HTN,HLD,DM-2,taken his meds regularly,no side effect ,he denies any hypoglycemic reaction.  He put on some weight,not exercising nor watching his calories intake.  He takes his meds regularly,no side effect.  He did not go for his cardiac ca score,ordered 12/2024  he had his eye exam last year and had no retinopathy,will make appt. for this year  In past,He passed a kidney stone ,but lost the stone,no recurrence of his back pain,he drinks enough water daily.  COLONOSCOPY: 5/24/2013   Fit test 2023  Last PSA 12/3/2024         Review of Systems   Constitutional:  Negative for fatigue.        He puts on some weight.   HENT: Negative.  Negative for congestion.    Eyes: Negative.    Respiratory: Negative.  Negative for cough, chest tightness and shortness of breath.    Cardiovascular:  Negative for chest pain and leg swelling.   Gastrointestinal: Negative.  Negative for abdominal pain, blood in stool, diarrhea and nausea.   Endocrine: Negative.    Genitourinary: Negative.  Negative for difficulty urinating and dysuria.   Neurological: Negative.  Negative for dizziness and headaches.   Hematological: Negative.    Psychiatric/Behavioral: Negative.         Objective   /67 (BP Location: Left arm, Patient Position: Sitting, BP Cuff Size: Large adult)   Pulse 69   Temp 36.4 °C (97.6 °F) (Temporal)   Wt 118 kg (259 lb 6.4 oz)   SpO2 95%   BMI 42.70 kg/m²     Physical Exam  Vitals reviewed.   Constitutional:       General: He is not in acute distress.     Appearance: Normal appearance. He is obese.   HENT:      Head: Normocephalic and atraumatic.      Mouth/Throat:      Mouth: Mucous membranes are moist.     Eyes:      General: No scleral icterus.     Extraocular Movements: Extraocular movements intact.      Conjunctiva/sclera: Conjunctivae normal.      Pupils:  Pupils are equal, round, and reactive to light.     Neck:      Vascular: No carotid bruit.     Cardiovascular:      Rate and Rhythm: Normal rate and regular rhythm.      Heart sounds: Normal heart sounds.   Pulmonary:      Effort: Pulmonary effort is normal. No respiratory distress.      Breath sounds: Normal breath sounds. No wheezing or rhonchi.   Abdominal:      General: Abdomen is flat. Bowel sounds are normal.      Palpations: Abdomen is soft.      Tenderness: There is no abdominal tenderness.     Musculoskeletal:         General: Normal range of motion.      Cervical back: Normal range of motion and neck supple.      Right lower leg: No edema.      Left lower leg: No edema.   Lymphadenopathy:      Cervical: No cervical adenopathy.     Skin:     General: Skin is warm and dry.     Neurological:      General: No focal deficit present.      Mental Status: He is alert and oriented to person, place, and time.     Psychiatric:         Mood and Affect: Mood normal.         Behavior: Behavior normal.         Assessment/Plan   Problem List Items Addressed This Visit           ICD-10-CM    Benign essential hypertension - Primary I10    Stable on current medication.  Follow up in 3-4 months.               Relevant Orders    CBC    Diabetes mellitus type II, controlled (Multi) E11.9    Will refer to our  pharmacy team to consider starting GLP1 which will also help his BMI.         Relevant Orders    POCT Fingerstick Glucose manually resulted (Completed)    POCT glycosylated hemoglobin (Hb A1C) manually resulted (Completed)    Referral to Clinical Pharmacy    Comprehensive Metabolic Panel    Hemoglobin A1c    Hyperlipidemia E78.5      On statin,follow low fat diet.           Relevant Orders    Lipid Panel    TSH with reflex to Free T4 if abnormal    Class 3 severe obesity due to excess calories with serious comorbidity and body mass index (BMI) of 40.0 to 44.9 in adult E66.813, Z68.41    I spent >15 minutes face to face  with individual providing recommendations for nutrition choices and exercise plan to help achieve weight reduction.  Plan to start GLP1,will refer to our team pharmacist,will check if covered by his insurance.         Relevant Orders    Referral to Clinical Pharmacy    Spondylolisthesis, acquired M43.10    Stage 3a chronic kidney disease (Multi) N18.31    Primary osteoarthritis of right knee M17.11     Other Visit Diagnoses         Codes      Multiple nevi     D22.9    Relevant Orders    Referral to Dermatology

## 2025-07-20 PROBLEM — D49.2 SKIN NEOPLASM: Status: RESOLVED | Noted: 2023-05-08 | Resolved: 2025-07-20

## 2025-07-20 ASSESSMENT — ENCOUNTER SYMPTOMS
FATIGUE: 0
HEADACHES: 0
BLOOD IN STOOL: 0
EYES NEGATIVE: 1
PSYCHIATRIC NEGATIVE: 1
CHEST TIGHTNESS: 0
NAUSEA: 0
DIFFICULTY URINATING: 0
GASTROINTESTINAL NEGATIVE: 1
ENDOCRINE NEGATIVE: 1
SHORTNESS OF BREATH: 0
ABDOMINAL PAIN: 0
DIZZINESS: 0
DYSURIA: 0
HEMATOLOGIC/LYMPHATIC NEGATIVE: 1
DIARRHEA: 0
NEUROLOGICAL NEGATIVE: 1
COUGH: 0
RESPIRATORY NEGATIVE: 1

## 2025-07-20 NOTE — ASSESSMENT & PLAN NOTE
I spent >15 minutes face to face with individual providing recommendations for nutrition choices and exercise plan to help achieve weight reduction.  Plan to start GLP1,will refer to our team pharmacist,will check if covered by his insurance.

## 2025-08-18 ENCOUNTER — APPOINTMENT (OUTPATIENT)
Dept: PHARMACY | Facility: HOSPITAL | Age: 70
End: 2025-08-18
Payer: MEDICARE

## 2025-08-18 DIAGNOSIS — E11.9 CONTROLLED TYPE 2 DIABETES MELLITUS WITHOUT COMPLICATION, WITHOUT LONG-TERM CURRENT USE OF INSULIN: Primary | ICD-10-CM

## 2025-08-18 DIAGNOSIS — E66.813 CLASS 3 SEVERE OBESITY DUE TO EXCESS CALORIES WITH SERIOUS COMORBIDITY AND BODY MASS INDEX (BMI) OF 40.0 TO 44.9 IN ADULT: ICD-10-CM

## 2025-08-18 RX ORDER — TIRZEPATIDE 2.5 MG/.5ML
2.5 INJECTION, SOLUTION SUBCUTANEOUS WEEKLY
Qty: 2 ML | Refills: 0 | Status: SHIPPED | OUTPATIENT
Start: 2025-08-18

## 2025-08-21 PROCEDURE — RXMED WILLOW AMBULATORY MEDICATION CHARGE

## 2025-08-25 ENCOUNTER — PHARMACY VISIT (OUTPATIENT)
Dept: PHARMACY | Facility: CLINIC | Age: 70
End: 2025-08-25
Payer: MEDICARE

## 2025-09-15 ENCOUNTER — APPOINTMENT (OUTPATIENT)
Dept: PHARMACY | Facility: HOSPITAL | Age: 70
End: 2025-09-15
Payer: MEDICARE

## 2025-10-27 ENCOUNTER — APPOINTMENT (OUTPATIENT)
Dept: PRIMARY CARE | Facility: CLINIC | Age: 70
End: 2025-10-27
Payer: MEDICARE

## 2026-03-20 ENCOUNTER — APPOINTMENT (OUTPATIENT)
Dept: PRIMARY CARE | Facility: CLINIC | Age: 71
End: 2026-03-20
Payer: MEDICARE